# Patient Record
Sex: FEMALE | Race: OTHER | HISPANIC OR LATINO | Employment: PART TIME | URBAN - METROPOLITAN AREA
[De-identification: names, ages, dates, MRNs, and addresses within clinical notes are randomized per-mention and may not be internally consistent; named-entity substitution may affect disease eponyms.]

---

## 2022-08-29 ENCOUNTER — HOSPITAL ENCOUNTER (OUTPATIENT)
Facility: HOSPITAL | Age: 42
Discharge: HOME | End: 2022-08-30
Attending: EMERGENCY MEDICINE | Admitting: SURGERY

## 2022-08-29 DIAGNOSIS — K35.80 ACUTE APPENDICITIS, UNSPECIFIED ACUTE APPENDICITIS TYPE: Primary | ICD-10-CM

## 2022-08-29 LAB
ALBUMIN SERPL-MCNC: 3.9 GM/DL (ref 3.5–5)
ALBUMIN/GLOB SERPL: 0.9 RATIO (ref 1.1–2)
ALP SERPL-CCNC: 89 UNIT/L (ref 40–150)
ALT SERPL-CCNC: 12 UNIT/L (ref 0–55)
APPEARANCE UR: CLEAR
AST SERPL-CCNC: 19 UNIT/L (ref 5–34)
B-HCG SERPL QL: NEGATIVE
B-HCG UR QL: NEGATIVE
BACTERIA #/AREA URNS AUTO: NORMAL /HPF
BASOPHILS # BLD AUTO: 0.05 X10(3)/MCL (ref 0–0.2)
BASOPHILS NFR BLD AUTO: 0.5 %
BILIRUB UR QL STRIP.AUTO: NEGATIVE MG/DL
BILIRUBIN DIRECT+TOT PNL SERPL-MCNC: 0.6 MG/DL
BUN SERPL-MCNC: 7.8 MG/DL (ref 7–18.7)
CALCIUM SERPL-MCNC: 9.4 MG/DL (ref 8.4–10.2)
CHLORIDE SERPL-SCNC: 102 MMOL/L (ref 98–107)
CO2 SERPL-SCNC: 29 MMOL/L (ref 22–29)
COLOR UR AUTO: YELLOW
CREAT SERPL-MCNC: 0.67 MG/DL (ref 0.55–1.02)
CTP QC/QA: YES
EOSINOPHIL # BLD AUTO: 0.29 X10(3)/MCL (ref 0–0.9)
EOSINOPHIL NFR BLD AUTO: 2.7 %
ERYTHROCYTE [DISTWIDTH] IN BLOOD BY AUTOMATED COUNT: 16 % (ref 11.5–17)
GFR SERPLBLD CREATININE-BSD FMLA CKD-EPI: >60 MLS/MIN/1.73/M2
GLOBULIN SER-MCNC: 4.2 GM/DL (ref 2.4–3.5)
GLUCOSE SERPL-MCNC: 93 MG/DL (ref 74–100)
GLUCOSE UR QL STRIP.AUTO: NEGATIVE MG/DL
HCT VFR BLD AUTO: 41.4 % (ref 37–47)
HGB BLD-MCNC: 13.1 GM/DL (ref 12–16)
IMM GRANULOCYTES # BLD AUTO: 0.05 X10(3)/MCL (ref 0–0.04)
IMM GRANULOCYTES NFR BLD AUTO: 0.5 %
KETONES UR QL STRIP.AUTO: NEGATIVE MG/DL
LEUKOCYTE ESTERASE UR QL STRIP.AUTO: NEGATIVE UNIT/L
LIPASE SERPL-CCNC: 12 U/L
LYMPHOCYTES # BLD AUTO: 2.75 X10(3)/MCL (ref 0.6–4.6)
LYMPHOCYTES NFR BLD AUTO: 25.8 %
MCH RBC QN AUTO: 28.8 PG (ref 27–31)
MCHC RBC AUTO-ENTMCNC: 31.6 MG/DL (ref 33–36)
MCV RBC AUTO: 91 FL (ref 80–94)
MONOCYTES # BLD AUTO: 0.66 X10(3)/MCL (ref 0.1–1.3)
MONOCYTES NFR BLD AUTO: 6.2 %
NEUTROPHILS # BLD AUTO: 6.9 X10(3)/MCL (ref 2.1–9.2)
NEUTROPHILS NFR BLD AUTO: 64.3 %
NITRITE UR QL STRIP.AUTO: NEGATIVE
NRBC BLD AUTO-RTO: 0 %
PH UR STRIP.AUTO: 6.5 [PH]
PLATELET # BLD AUTO: 400 X10(3)/MCL (ref 130–400)
PMV BLD AUTO: 9.9 FL (ref 7.4–10.4)
POTASSIUM SERPL-SCNC: 4.3 MMOL/L (ref 3.5–5.1)
PROT SERPL-MCNC: 8.1 GM/DL (ref 6.4–8.3)
PROT UR QL STRIP.AUTO: NEGATIVE MG/DL
RBC # BLD AUTO: 4.55 X10(6)/MCL (ref 4.2–5.4)
RBC #/AREA URNS AUTO: <5 /HPF
RBC UR QL AUTO: ABNORMAL UNIT/L
SODIUM SERPL-SCNC: 135 MMOL/L (ref 136–145)
SP GR UR STRIP.AUTO: 1.01 (ref 1–1.03)
SQUAMOUS #/AREA URNS AUTO: <5 /HPF
UROBILINOGEN UR STRIP-ACNC: 0.2 MG/DL
WBC # SPEC AUTO: 10.7 X10(3)/MCL (ref 4.5–11.5)
WBC #/AREA URNS AUTO: <5 /HPF

## 2022-08-29 PROCEDURE — G0378 HOSPITAL OBSERVATION PER HR: HCPCS

## 2022-08-29 PROCEDURE — 80053 COMPREHEN METABOLIC PANEL: CPT | Performed by: PHYSICIAN ASSISTANT

## 2022-08-29 PROCEDURE — 81025 URINE PREGNANCY TEST: CPT | Performed by: NURSE PRACTITIONER

## 2022-08-29 PROCEDURE — 36415 COLL VENOUS BLD VENIPUNCTURE: CPT | Performed by: PHYSICIAN ASSISTANT

## 2022-08-29 PROCEDURE — 96375 TX/PRO/DX INJ NEW DRUG ADDON: CPT

## 2022-08-29 PROCEDURE — 81001 URINALYSIS AUTO W/SCOPE: CPT | Performed by: PHYSICIAN ASSISTANT

## 2022-08-29 PROCEDURE — 83690 ASSAY OF LIPASE: CPT | Performed by: PHYSICIAN ASSISTANT

## 2022-08-29 PROCEDURE — 63600175 PHARM REV CODE 636 W HCPCS: Performed by: NURSE PRACTITIONER

## 2022-08-29 PROCEDURE — 99285 EMERGENCY DEPT VISIT HI MDM: CPT | Mod: 25

## 2022-08-29 PROCEDURE — 85025 COMPLETE CBC W/AUTO DIFF WBC: CPT | Performed by: PHYSICIAN ASSISTANT

## 2022-08-29 PROCEDURE — 25500020 PHARM REV CODE 255: Performed by: NURSE PRACTITIONER

## 2022-08-29 RX ORDER — ONDANSETRON 4 MG/1
8 TABLET, ORALLY DISINTEGRATING ORAL EVERY 8 HOURS PRN
Status: DISCONTINUED | OUTPATIENT
Start: 2022-08-30 | End: 2022-08-30 | Stop reason: HOSPADM

## 2022-08-29 RX ORDER — SODIUM CHLORIDE 9 MG/ML
INJECTION, SOLUTION INTRAVENOUS CONTINUOUS
Status: DISCONTINUED | OUTPATIENT
Start: 2022-08-30 | End: 2022-08-30 | Stop reason: HOSPADM

## 2022-08-29 RX ORDER — ACETAMINOPHEN 325 MG/1
650 TABLET ORAL EVERY 8 HOURS PRN
Status: DISCONTINUED | OUTPATIENT
Start: 2022-08-30 | End: 2022-08-30 | Stop reason: HOSPADM

## 2022-08-29 RX ORDER — ONDANSETRON 2 MG/ML
4 INJECTION INTRAMUSCULAR; INTRAVENOUS
Status: COMPLETED | OUTPATIENT
Start: 2022-08-29 | End: 2022-08-29

## 2022-08-29 RX ORDER — ACETAMINOPHEN 325 MG/1
650 TABLET ORAL EVERY 6 HOURS
Status: DISCONTINUED | OUTPATIENT
Start: 2022-08-30 | End: 2022-08-30 | Stop reason: HOSPADM

## 2022-08-29 RX ORDER — ENOXAPARIN SODIUM 100 MG/ML
40 INJECTION SUBCUTANEOUS EVERY 24 HOURS
Status: DISCONTINUED | OUTPATIENT
Start: 2022-08-30 | End: 2022-08-30

## 2022-08-29 RX ORDER — OXYCODONE HYDROCHLORIDE 5 MG/1
5 TABLET ORAL EVERY 4 HOURS PRN
Status: DISCONTINUED | OUTPATIENT
Start: 2022-08-30 | End: 2022-08-30 | Stop reason: HOSPADM

## 2022-08-29 RX ORDER — MORPHINE SULFATE 4 MG/ML
2 INJECTION, SOLUTION INTRAMUSCULAR; INTRAVENOUS
Status: COMPLETED | OUTPATIENT
Start: 2022-08-29 | End: 2022-08-29

## 2022-08-29 RX ORDER — TALC
6 POWDER (GRAM) TOPICAL NIGHTLY PRN
Status: DISCONTINUED | OUTPATIENT
Start: 2022-08-30 | End: 2022-08-30 | Stop reason: HOSPADM

## 2022-08-29 RX ORDER — MORPHINE SULFATE 4 MG/ML
2 INJECTION, SOLUTION INTRAMUSCULAR; INTRAVENOUS EVERY 4 HOURS PRN
Status: DISCONTINUED | OUTPATIENT
Start: 2022-08-30 | End: 2022-08-30 | Stop reason: HOSPADM

## 2022-08-29 RX ORDER — OXYCODONE HYDROCHLORIDE 5 MG/1
10 TABLET ORAL EVERY 4 HOURS PRN
Status: DISCONTINUED | OUTPATIENT
Start: 2022-08-30 | End: 2022-08-30 | Stop reason: HOSPADM

## 2022-08-29 RX ORDER — LIDOCAINE HYDROCHLORIDE 10 MG/ML
1 INJECTION, SOLUTION EPIDURAL; INFILTRATION; INTRACAUDAL; PERINEURAL ONCE
Status: DISCONTINUED | OUTPATIENT
Start: 2022-08-30 | End: 2022-08-30 | Stop reason: HOSPADM

## 2022-08-29 RX ORDER — SODIUM CHLORIDE 0.9 % (FLUSH) 0.9 %
10 SYRINGE (ML) INJECTION
Status: DISCONTINUED | OUTPATIENT
Start: 2022-08-30 | End: 2022-08-30 | Stop reason: HOSPADM

## 2022-08-29 RX ADMIN — IOPAMIDOL 100 ML: 755 INJECTION, SOLUTION INTRAVENOUS at 09:08

## 2022-08-29 RX ADMIN — MORPHINE SULFATE 2 MG: 4 INJECTION INTRAVENOUS at 10:08

## 2022-08-29 RX ADMIN — ONDANSETRON 4 MG: 2 INJECTION INTRAMUSCULAR; INTRAVENOUS at 10:08

## 2022-08-29 NOTE — FIRST PROVIDER EVALUATION
"Medical screening exam completed.  I have conducted a focused provider triage encounter, findings are as follows:    Brief history of present illness:  42 year old female presents to ED for abdominal pain and dysuria since last night; denies fever, vomiting     Vitals:    08/29/22 1132   BP: 100/62   Pulse: 91   Resp: 20   Temp: 98.4 °F (36.9 °C)   TempSrc: Oral   SpO2: 99%   Weight: 56.7 kg (125 lb)   Height: 5' 3" (1.6 m)       Pertinent physical exam:  awake, alert     Brief workup plan:  cbc, cmp, lipase, ua, upt     Preliminary workup initiated; this workup will be continued and followed by the physician or advanced practice provider that is assigned to the patient when roomed.  "

## 2022-08-30 ENCOUNTER — ANESTHESIA EVENT (OUTPATIENT)
Dept: SURGERY | Facility: HOSPITAL | Age: 42
End: 2022-08-30

## 2022-08-30 ENCOUNTER — ANESTHESIA (OUTPATIENT)
Dept: SURGERY | Facility: HOSPITAL | Age: 42
End: 2022-08-30

## 2022-08-30 VITALS
WEIGHT: 125 LBS | BODY MASS INDEX: 22.15 KG/M2 | OXYGEN SATURATION: 98 % | SYSTOLIC BLOOD PRESSURE: 102 MMHG | RESPIRATION RATE: 18 BRPM | TEMPERATURE: 98 F | HEIGHT: 63 IN | DIASTOLIC BLOOD PRESSURE: 61 MMHG | HEART RATE: 72 BPM

## 2022-08-30 PROBLEM — K35.80 ACUTE APPENDICITIS: Status: ACTIVE | Noted: 2022-08-30

## 2022-08-30 LAB
ANION GAP SERPL CALC-SCNC: 6 MEQ/L
BASOPHILS # BLD AUTO: 0.05 X10(3)/MCL (ref 0–0.2)
BASOPHILS NFR BLD AUTO: 0.8 %
BUN SERPL-MCNC: 7.1 MG/DL (ref 7–18.7)
CALCIUM SERPL-MCNC: 9.2 MG/DL (ref 8.4–10.2)
CHLORIDE SERPL-SCNC: 105 MMOL/L (ref 98–107)
CO2 SERPL-SCNC: 25 MMOL/L (ref 22–29)
CREAT SERPL-MCNC: 0.7 MG/DL (ref 0.55–1.02)
CREAT/UREA NIT SERPL: 10
EOSINOPHIL # BLD AUTO: 0.55 X10(3)/MCL (ref 0–0.9)
EOSINOPHIL NFR BLD AUTO: 8.6 %
ERYTHROCYTE [DISTWIDTH] IN BLOOD BY AUTOMATED COUNT: 15.9 % (ref 11.5–17)
GFR SERPLBLD CREATININE-BSD FMLA CKD-EPI: >60 MLS/MIN/1.73/M2
GLUCOSE SERPL-MCNC: 88 MG/DL (ref 74–100)
HCT VFR BLD AUTO: 43.6 % (ref 37–47)
HGB BLD-MCNC: 13.3 GM/DL (ref 12–16)
IMM GRANULOCYTES # BLD AUTO: 0.01 X10(3)/MCL (ref 0–0.04)
IMM GRANULOCYTES NFR BLD AUTO: 0.2 %
LYMPHOCYTES # BLD AUTO: 2.66 X10(3)/MCL (ref 0.6–4.6)
LYMPHOCYTES NFR BLD AUTO: 41.7 %
MAGNESIUM SERPL-MCNC: 2.3 MG/DL (ref 1.6–2.6)
MCH RBC QN AUTO: 28.7 PG (ref 27–31)
MCHC RBC AUTO-ENTMCNC: 30.5 MG/DL (ref 33–36)
MCV RBC AUTO: 94 FL (ref 80–94)
MONOCYTES # BLD AUTO: 0.61 X10(3)/MCL (ref 0.1–1.3)
MONOCYTES NFR BLD AUTO: 9.6 %
NEUTROPHILS # BLD AUTO: 2.5 X10(3)/MCL (ref 2.1–9.2)
NEUTROPHILS NFR BLD AUTO: 39.1 %
NRBC BLD AUTO-RTO: 0 %
PHOSPHATE SERPL-MCNC: 3.5 MG/DL (ref 2.3–4.7)
PLATELET # BLD AUTO: 284 X10(3)/MCL (ref 130–400)
PMV BLD AUTO: 10.3 FL (ref 7.4–10.4)
POTASSIUM SERPL-SCNC: 4 MMOL/L (ref 3.5–5.1)
RBC # BLD AUTO: 4.64 X10(6)/MCL (ref 4.2–5.4)
SARS-COV-2 RDRP RESP QL NAA+PROBE: NEGATIVE
SODIUM SERPL-SCNC: 136 MMOL/L (ref 136–145)
WBC # SPEC AUTO: 6.4 X10(3)/MCL (ref 4.5–11.5)

## 2022-08-30 PROCEDURE — 27201423 OPTIME MED/SURG SUP & DEVICES STERILE SUPPLY: Performed by: SURGERY

## 2022-08-30 PROCEDURE — 96366 THER/PROPH/DIAG IV INF ADDON: CPT

## 2022-08-30 PROCEDURE — 37000008 HC ANESTHESIA 1ST 15 MINUTES: Performed by: SURGERY

## 2022-08-30 PROCEDURE — 25000003 PHARM REV CODE 250: Performed by: ANESTHESIOLOGY

## 2022-08-30 PROCEDURE — 84100 ASSAY OF PHOSPHORUS: CPT | Performed by: STUDENT IN AN ORGANIZED HEALTH CARE EDUCATION/TRAINING PROGRAM

## 2022-08-30 PROCEDURE — 37000009 HC ANESTHESIA EA ADD 15 MINS: Performed by: SURGERY

## 2022-08-30 PROCEDURE — 85025 COMPLETE CBC W/AUTO DIFF WBC: CPT | Performed by: STUDENT IN AN ORGANIZED HEALTH CARE EDUCATION/TRAINING PROGRAM

## 2022-08-30 PROCEDURE — 83735 ASSAY OF MAGNESIUM: CPT | Performed by: STUDENT IN AN ORGANIZED HEALTH CARE EDUCATION/TRAINING PROGRAM

## 2022-08-30 PROCEDURE — G0378 HOSPITAL OBSERVATION PER HR: HCPCS

## 2022-08-30 PROCEDURE — 63600175 PHARM REV CODE 636 W HCPCS: Performed by: NURSE ANESTHETIST, CERTIFIED REGISTERED

## 2022-08-30 PROCEDURE — 96365 THER/PROPH/DIAG IV INF INIT: CPT

## 2022-08-30 PROCEDURE — 71000033 HC RECOVERY, INTIAL HOUR: Performed by: SURGERY

## 2022-08-30 PROCEDURE — 36415 COLL VENOUS BLD VENIPUNCTURE: CPT | Performed by: STUDENT IN AN ORGANIZED HEALTH CARE EDUCATION/TRAINING PROGRAM

## 2022-08-30 PROCEDURE — 25000003 PHARM REV CODE 250: Performed by: SURGERY

## 2022-08-30 PROCEDURE — 36000709 HC OR TIME LEV III EA ADD 15 MIN: Performed by: SURGERY

## 2022-08-30 PROCEDURE — 87635 SARS-COV-2 COVID-19 AMP PRB: CPT | Performed by: STUDENT IN AN ORGANIZED HEALTH CARE EDUCATION/TRAINING PROGRAM

## 2022-08-30 PROCEDURE — 36000708 HC OR TIME LEV III 1ST 15 MIN: Performed by: SURGERY

## 2022-08-30 PROCEDURE — 25000003 PHARM REV CODE 250: Performed by: NURSE ANESTHETIST, CERTIFIED REGISTERED

## 2022-08-30 PROCEDURE — 25000003 PHARM REV CODE 250

## 2022-08-30 PROCEDURE — 80048 BASIC METABOLIC PNL TOTAL CA: CPT | Performed by: STUDENT IN AN ORGANIZED HEALTH CARE EDUCATION/TRAINING PROGRAM

## 2022-08-30 PROCEDURE — 25000003 PHARM REV CODE 250: Performed by: STUDENT IN AN ORGANIZED HEALTH CARE EDUCATION/TRAINING PROGRAM

## 2022-08-30 PROCEDURE — 63600175 PHARM REV CODE 636 W HCPCS: Performed by: STUDENT IN AN ORGANIZED HEALTH CARE EDUCATION/TRAINING PROGRAM

## 2022-08-30 RX ORDER — ONDANSETRON 2 MG/ML
INJECTION INTRAMUSCULAR; INTRAVENOUS
Status: DISCONTINUED | OUTPATIENT
Start: 2022-08-30 | End: 2022-08-30

## 2022-08-30 RX ORDER — SCOLOPAMINE TRANSDERMAL SYSTEM 1 MG/1
PATCH, EXTENDED RELEASE TRANSDERMAL
Status: COMPLETED
Start: 2022-08-30 | End: 2022-08-30

## 2022-08-30 RX ORDER — OXYCODONE HYDROCHLORIDE 5 MG/1
5 TABLET ORAL EVERY 6 HOURS PRN
Qty: 15 TABLET | Refills: 0 | Status: SHIPPED | OUTPATIENT
Start: 2022-08-30

## 2022-08-30 RX ORDER — PROPOFOL 10 MG/ML
VIAL (ML) INTRAVENOUS
Status: DISCONTINUED | OUTPATIENT
Start: 2022-08-30 | End: 2022-08-30

## 2022-08-30 RX ORDER — LIDOCAINE HYDROCHLORIDE 20 MG/ML
INJECTION, SOLUTION EPIDURAL; INFILTRATION; INTRACAUDAL; PERINEURAL
Status: DISCONTINUED | OUTPATIENT
Start: 2022-08-30 | End: 2022-08-30

## 2022-08-30 RX ORDER — ENOXAPARIN SODIUM 100 MG/ML
40 INJECTION SUBCUTANEOUS EVERY 24 HOURS
Status: DISCONTINUED | OUTPATIENT
Start: 2022-08-30 | End: 2022-08-30 | Stop reason: HOSPADM

## 2022-08-30 RX ORDER — DEXAMETHASONE SODIUM PHOSPHATE 4 MG/ML
INJECTION, SOLUTION INTRA-ARTICULAR; INTRALESIONAL; INTRAMUSCULAR; INTRAVENOUS; SOFT TISSUE
Status: DISCONTINUED | OUTPATIENT
Start: 2022-08-30 | End: 2022-08-30

## 2022-08-30 RX ORDER — SCOLOPAMINE TRANSDERMAL SYSTEM 1 MG/1
1 PATCH, EXTENDED RELEASE TRANSDERMAL
Status: DISCONTINUED | OUTPATIENT
Start: 2022-08-30 | End: 2022-08-30 | Stop reason: HOSPADM

## 2022-08-30 RX ORDER — ROCURONIUM BROMIDE 10 MG/ML
INJECTION, SOLUTION INTRAVENOUS
Status: DISCONTINUED | OUTPATIENT
Start: 2022-08-30 | End: 2022-08-30

## 2022-08-30 RX ORDER — FENTANYL CITRATE 50 UG/ML
INJECTION, SOLUTION INTRAMUSCULAR; INTRAVENOUS
Status: DISCONTINUED | OUTPATIENT
Start: 2022-08-30 | End: 2022-08-30

## 2022-08-30 RX ORDER — MIDAZOLAM HYDROCHLORIDE 1 MG/ML
INJECTION INTRAMUSCULAR; INTRAVENOUS
Status: DISCONTINUED | OUTPATIENT
Start: 2022-08-30 | End: 2022-08-30

## 2022-08-30 RX ORDER — BUPIVACAINE HYDROCHLORIDE AND EPINEPHRINE 2.5; 5 MG/ML; UG/ML
INJECTION, SOLUTION EPIDURAL; INFILTRATION; INTRACAUDAL; PERINEURAL
Status: DISCONTINUED | OUTPATIENT
Start: 2022-08-30 | End: 2022-08-30 | Stop reason: HOSPADM

## 2022-08-30 RX ORDER — ACETAMINOPHEN 10 MG/ML
INJECTION, SOLUTION INTRAVENOUS
Status: DISCONTINUED | OUTPATIENT
Start: 2022-08-30 | End: 2022-08-30

## 2022-08-30 RX ORDER — PHENYLEPHRINE HYDROCHLORIDE 10 MG/ML
INJECTION INTRAVENOUS
Status: DISCONTINUED | OUTPATIENT
Start: 2022-08-30 | End: 2022-08-30

## 2022-08-30 RX ADMIN — PHENYLEPHRINE HYDROCHLORIDE 100 MCG: 10 INJECTION INTRAVENOUS at 08:08

## 2022-08-30 RX ADMIN — ROCURONIUM BROMIDE 40 MG: 10 INJECTION, SOLUTION INTRAVENOUS at 07:08

## 2022-08-30 RX ADMIN — PHENYLEPHRINE HYDROCHLORIDE 100 MCG: 10 INJECTION INTRAVENOUS at 07:08

## 2022-08-30 RX ADMIN — SCOPOLAMINE 1 PATCH: 1 PATCH TRANSDERMAL at 03:08

## 2022-08-30 RX ADMIN — ROCURONIUM BROMIDE 10 MG: 10 INJECTION, SOLUTION INTRAVENOUS at 07:08

## 2022-08-30 RX ADMIN — SUGAMMADEX 200 MG: 100 INJECTION, SOLUTION INTRAVENOUS at 08:08

## 2022-08-30 RX ADMIN — PIPERACILLIN AND TAZOBACTAM 4.5 G: 4; .5 INJECTION, POWDER, LYOPHILIZED, FOR SOLUTION INTRAVENOUS; PARENTERAL at 12:08

## 2022-08-30 RX ADMIN — OXYCODONE 10 MG: 5 TABLET ORAL at 04:08

## 2022-08-30 RX ADMIN — SCOPOLAMINE 1 PATCH: 1 PATCH TRANSDERMAL at 06:08

## 2022-08-30 RX ADMIN — SODIUM CHLORIDE, SODIUM GLUCONATE, SODIUM ACETATE, POTASSIUM CHLORIDE AND MAGNESIUM CHLORIDE: 526; 502; 368; 37; 30 INJECTION, SOLUTION INTRAVENOUS at 07:08

## 2022-08-30 RX ADMIN — ONDANSETRON 4 MG: 2 INJECTION INTRAMUSCULAR; INTRAVENOUS at 08:08

## 2022-08-30 RX ADMIN — MIDAZOLAM HYDROCHLORIDE 2 MG: 1 INJECTION, SOLUTION INTRAMUSCULAR; INTRAVENOUS at 07:08

## 2022-08-30 RX ADMIN — PROPOFOL 100 MG: 10 INJECTION, EMULSION INTRAVENOUS at 07:08

## 2022-08-30 RX ADMIN — ACETAMINOPHEN 1000 MG: 10 INJECTION, SOLUTION INTRAVENOUS at 08:08

## 2022-08-30 RX ADMIN — OXYCODONE 10 MG: 5 TABLET ORAL at 12:08

## 2022-08-30 RX ADMIN — PIPERACILLIN AND TAZOBACTAM 4.5 G: 4; .5 INJECTION, POWDER, LYOPHILIZED, FOR SOLUTION INTRAVENOUS; PARENTERAL at 07:08

## 2022-08-30 RX ADMIN — SODIUM CHLORIDE: 9 INJECTION, SOLUTION INTRAVENOUS at 01:08

## 2022-08-30 RX ADMIN — FENTANYL CITRATE 100 MCG: 50 INJECTION, SOLUTION INTRAMUSCULAR; INTRAVENOUS at 07:08

## 2022-08-30 RX ADMIN — ACETAMINOPHEN 650 MG: 325 TABLET ORAL at 03:08

## 2022-08-30 RX ADMIN — LIDOCAINE HYDROCHLORIDE 50 MG: 20 INJECTION, SOLUTION EPIDURAL; INFILTRATION; INTRACAUDAL; PERINEURAL at 08:08

## 2022-08-30 RX ADMIN — SODIUM CHLORIDE, SODIUM GLUCONATE, SODIUM ACETATE, POTASSIUM CHLORIDE AND MAGNESIUM CHLORIDE: 526; 502; 368; 37; 30 INJECTION, SOLUTION INTRAVENOUS at 08:08

## 2022-08-30 RX ADMIN — DEXAMETHASONE SODIUM PHOSPHATE 8 MG: 4 INJECTION, SOLUTION INTRA-ARTICULAR; INTRALESIONAL; INTRAMUSCULAR; INTRAVENOUS; SOFT TISSUE at 07:08

## 2022-08-30 RX ADMIN — LIDOCAINE HYDROCHLORIDE 50 MG: 20 INJECTION, SOLUTION EPIDURAL; INFILTRATION; INTRACAUDAL; PERINEURAL at 07:08

## 2022-08-30 RX ADMIN — PROPOFOL 50 MG: 10 INJECTION, EMULSION INTRAVENOUS at 07:08

## 2022-08-30 NOTE — PROGRESS NOTES
Acute Care Surgery  Daily Progress Note      Subjective:  Afebrile, VSS. NAEON. Consented for laparoscopic appendectomy this morning. All questions answered.    Objective:    Vitals:  Vitals:    08/30/22 0616   BP: 105/76   Pulse: 81   Resp: 18   Temp: 97.1 °F (36.2 °C)        Intake/Output:  N/a    Physical Exam:  Gen: NAD  Neuro: awake, alert, answering questions appropriately  CV: RR  Resp: non-labored breathing, ANAIS  Abd: soft, ND, tender to palpation in RLQ  Ext: moves all 4 spontaneously and purposefully  Skin: warm, well perfused    Labs:  WBC 6.4 (10.7)    Imaging:  None new    Micro/Path/Other:  N/a    Assessment/Plan:  41yo with PMHx of HIV, TB, and bronchiectasis comes to the ED with acute appendicitis.     - OR this morning for laparoscopic appendectomy  - Zosyn  - NPO  - mIVF @ 125cc/hr  - Lovenox  - prn pain and nausea control  - IS       Lana Mancilla MD   [employer] General Surgery, PGY2  08/30/2022 6:41 AM

## 2022-08-30 NOTE — DISCHARGE SUMMARY
[facility] - 5th Floor Med Surg  Discharge Summary      Admit Date: 8/29/2022    Discharge Date and Time:  08/30/2022 9:20 AM    Attending Physician: Merrick Hernandez MD     Reason for Admission: acute appendicitis    Procedures Performed: Procedure(s) (LRB):  APPENDECTOMY, LAPAROSCOPIC (N/A)    Hospital Course (synopsis of major diagnoses, care, treatment, and services provided during the course of the hospital stay): She was admitted on the evening of 8/29/22 with acute appendicitis seen on CT. She was started on IV antibiotics. The next morning, she was taken to the OR for laparoscopic appendectomy. See operative report for details. Post-operatively, she was deemed stable for discharge.     Goals of Care Treatment Preferences:  Code Status: Full Code      Consults: none    Final Diagnoses:    Principal Problem: Acute appendicitis   Secondary Diagnoses:   Active Hospital Problems    Diagnosis  POA    *Acute appendicitis [K35.80]  Yes      Resolved Hospital Problems   No resolved problems to display.       Discharged Condition: fair    Disposition: Home or Self Care    Follow Up/Patient Instructions:     Medications:  Reconciled Home Medications:      Medication List        START taking these medications      oxyCODONE 5 MG immediate release tablet  Commonly known as: ROXICODONE  Take 1 tablet (5 mg total) by mouth every 6 (six) hours as needed (severe pain).            Discharge Procedure Orders   Diet Adult Regular     Lifting restrictions   Order Comments: No heavy lifting ( >10 lbs) for 3 weeks.     No dressing needed   Order Comments: Ok to shower as normal tomorrow. Avoid soaking in a bath or swimming in a pool for 1 week.      Follow-up Information       [facility] Follow up.    Why: Office will call you with a telemed visit on 9/14/22. Please call with any questions or concerns  Contact information:  [address] [address] [phone]

## 2022-08-30 NOTE — OP NOTE
[facility]                       [address]                      [place] [address]    PATIENT NAME:      DISHA CARPENTER   YOB: 1980  CSN:               [id number]  MRN:               [id number]  ADMIT DATE:        08/29/2022 11:38:00  PHYSICIAN:         Bethel Herrmann MD                          OPERATIVE REPORT      DATE OF SURGERY:    08/30/2022 00:00:00    SURGEON:  Bethel Herrmann MD    RESIDENT SURGEON:  Dr. Lana Smyth, PGY-2.    PREOPERATIVE DIAGNOSES:    1. Human immunodeficiency virus.  2. Acute appendicitis.    POSTOPERATIVE DIAGNOSES:    1. Human immunodeficiency virus.  2. Acute appendicitis.    PROCEDURE:  Laparoscopic appendectomy.    ANESTHESIA:  General endotracheal.    COMPLICATIONS:  None.    CONDITION:  Stable.    SPECIMEN:  Appendix.    ESTIMATED BLOOD LOSS:  5 cc.    FINDINGS:  Acute appendicitis without perforation.    PROCEDURE IN DETAIL:  After appropriate consents were obtained, the patient was   brought back to the operative suite, placed in supine position, placed under   general endotracheal anesthesia.  Next, the abdomen was prepped and draped in   the usual sterile fashion.  At this time, a time-out was done to make sure we   had the appropriate patient, appropriate operation, and the appropriate preop   medications.  Next, a supraumbilical incision was made, and a Veress needle was   used to enter the abdomen.  Once in the abdomen, pneumoperitoneum was created.    Next, a 5 mm optical trocar was used to enter the abdomen and the supraumbilical   spot where the Veress needle was under direct visualization.  Once we were in   the abdomen, we placed a suprapubic 5 mm and a 12 mm left lower quadrant trocar   under direct visualization.  Next, we put the patient head down and airplaned to   the patient's left.  We were able to easily see the inflamed appendix without   perforation.  We  grasped the appendix and made a window with the [place]   dissector at the base of the appendix between the cecum and the appendix.  Once   we had a big enough window for a stapler, we used a laparoscopic [place] 55 mm   blue load to come across the base of the appendix, and then we used 2 white   loads to come across the mesoappendix.  There were 2 point bleeders on the   staple line that we stopped with 2 hemoclips.  We then placed the appendix in a   laparoscopic EndoCatch bag and then removed it through the left lower quadrant   port site.  Next, we then evaluated for any bleeding.  No bleeding was seen.    Pneumoperitoneum was deflated.  All ports were removed, and the left lower   quadrant fascia site was closed with 0 Vicryl on a UR-6.  All skin incisions   were closed with 4-0 Vicryl and Dermabond.  At case end, all counts were   correct.  Patient tolerated procedure well, was ultimately transferred back to   PACU in stable condition.        ______________________________  Bethel Herrmann MD    WWMARIANNE/AQS  DD:  08/30/2022  Time:  08:32AM  DT:  08/30/2022  Time:  08:45AM  Job #:  [id number]      OPERATIVE REPORT

## 2022-08-30 NOTE — BRIEF OP NOTE
[facility] - 5th Floor Med Surg  Brief Operative Note    SUMMARY     Surgery Date: 8/30/2022     Surgeon(s) and Role:     * Bethel Herrmann Jr., MD - Primary    Assisting Surgeon: Lana Mancilla MD PGY2    Pre-op Diagnosis:  Acute appendicitis, unspecified acute appendicitis type [K35.80]    Post-op Diagnosis:  Post-Op Diagnosis Codes:     * Acute appendicitis, unspecified acute appendicitis type [K35.80]    Procedure(s) (LRB):  APPENDECTOMY, LAPAROSCOPIC (N/A)    Anesthesia: General    Operative Findings: inflamed appendix without perforation    Estimated Blood Loss: * No values recorded between 8/30/2022  7:49 AM and 8/30/2022  8:24 AM *    Estimated Blood Loss has not been documented. EBL = 5cc.         Specimens:   Specimen (24h ago, onward)       Start     Ordered    08/30/22 0815  Specimen to Pathology  RELEASE UPON ORDERING        References:    Click here for ordering Quick Tip   Question:  Release to patient  Answer:  Immediate    08/30/22 0802                    [id number]

## 2022-08-30 NOTE — ED NOTES
Assumed care for pt at this time. Pt presents to ed with complaints of dysuria and lower abd pain. Pt [place] speaking. Pt HIV+. Pt in NAD, AAOx4. Sx resident at bedside at this time. Pt resting comfortably  w/ no complaints. Pt is AAOx4, in NAD,  at bedside. Cardiac-respiratory monitors in progress

## 2022-08-30 NOTE — ED PROVIDER NOTES
Encounter Date: 2022       History     Chief Complaint   Patient presents with    Dysuria    Abdominal Pain     Pt. C/o lower abdominal pain and dysuria since yesterday. HIV +.     Patient is a 42-year-old female  that presents with periumbilical abdominal pain that has been present yesterday. Associated symptoms none. Surrounding information is nothing. Exacerbated by nothing. Relieved by nothing. Patient treatment prior to arrival none. Risk factors include none. Other history pertaining to this complaint nothing.       The history is provided by the patient. The history is limited by a language barrier. A  was used.   Review of patient's allergies indicates:  No Known Allergies  Past Medical History:   Diagnosis Date    Human immunodeficiency virus (HIV) disease     Human immunodeficiency virus (HIV) disease     TB (tuberculosis)      Past Surgical History:   Procedure Laterality Date     SECTION       History reviewed. No pertinent family history.  Social History     Tobacco Use    Smoking status: Never     Passive exposure: Never    Smokeless tobacco: Never   Substance Use Topics    Alcohol use: Not Currently    Drug use: Yes     Review of Systems   Constitutional:  Negative for chills and fever.   HENT:  Negative for trouble swallowing.    Respiratory:  Negative for chest tightness.    Cardiovascular:  Negative for chest pain.   Gastrointestinal:  Positive for abdominal pain.   Genitourinary:  Negative for dysuria and frequency.   Psychiatric/Behavioral:  Negative for suicidal ideas.    All other systems reviewed and are negative.    Physical Exam     Initial Vitals [22 1132]   BP Pulse Resp Temp SpO2   100/62 91 20 98.4 °F (36.9 °C) 99 %      MAP       --         Physical Exam    Nursing note and vitals reviewed.  Constitutional: She appears well-developed and well-nourished.   HENT:   Head: Normocephalic.   Eyes: EOM are normal.   Neck:   Normal range of  motion.  Cardiovascular:  Normal rate, regular rhythm, normal heart sounds and intact distal pulses.           Pulmonary/Chest: Breath sounds normal. No respiratory distress.   Abdominal: Abdomen is soft. Bowel sounds are normal. There is no abdominal tenderness.   Musculoskeletal:         General: Normal range of motion.      Cervical back: Normal range of motion.     Neurological: She is alert and oriented to person, place, and time. She has normal strength.   Skin: Skin is warm and dry.   Psychiatric: She has a normal mood and affect. Her behavior is normal. Judgment and thought content normal.       ED Course   Procedures  Labs Reviewed   URINALYSIS, REFLEX TO URINE CULTURE - Abnormal; Notable for the following components:       Result Value    Blood, UA Trace (*)     All other components within normal limits   COMPREHENSIVE METABOLIC PANEL - Abnormal; Notable for the following components:    Sodium Level 135 (*)     Globulin 4.2 (*)     Albumin/Globulin Ratio 0.9 (*)     All other components within normal limits   CBC WITH DIFFERENTIAL - Abnormal; Notable for the following components:    MCHC 31.6 (*)     IG# 0.05 (*)     All other components within normal limits   LIPASE - Normal   URINALYSIS, MICROSCOPIC - Normal   HCG QUALITATIVE URINE - Normal   SARS-COV-2 RNA AMPLIFICATION, QUAL - Normal   CBC W/ AUTO DIFFERENTIAL    Narrative:     The following orders were created for panel order CBC Auto Differential.  Procedure                               Abnormality         Status                     ---------                               -----------         ------                     CBC with Differential[[id number]]        Abnormal            Final result                 Please view results for these tests on the individual orders.          Imaging Results              CT Abdomen Pelvis With Contrast (Final result)  Result time 08/30/22 08:11:46      Final result by Trent Barrett MD (08/30/22 08:11:46)                    Impression:      1. Findings suggestive of acute appendicitis.  No abscess or pneumoperitoneum appreciated.  2. Tiny ground-glass nodules in the medial right middle lobe likely relate to endobronchial infection or inflammation.  No significant discrepancy between my interpretation and the preliminary radiology report.      Electronically signed by: Trent Barrett  Date:    08/30/2022  Time:    08:11               Narrative:    EXAMINATION:  CT ABDOMEN PELVIS WITH CONTRAST    CLINICAL HISTORY:  Abdominal pain, acute, nonlocalized;    TECHNIQUE:  CT imaging of the abdomen and pelvis after the administration of 100 mL of Isovue 370 intravenous contrast. Dose length product is 181 mGycm. Automatic exposure control, adjustment of mA/kV or iterative reconstruction technique used to limit radiation dose.    COMPARISON:  Chest CT 03/20/2015    FINDINGS:  Liver/biliary: Normal liver.  No radiodense gallstones. No intra or extrahepatic biliary ductal dilation.    Pancreas: Normal.    Spleen: Normal.    Adrenals: Normal.    Genitourinary: Symmetric renal enhancement. No hydronephrosis. Bladder under distended with no definitive abnormality.  No adnexal mass.    Stomach/bowel: Appendix contains luminal fluid and measures 9 mm in diameter with mural hyperenhancement.  Small bowel and colon otherwise normal in caliber.    Lymph nodes: No pathologically enlarged lymph node identified.    Peritoneum: No fluid collection or pneumoperitoneum.    Vasculature: Normal abdominal aortic caliber.    Abdominal wall: Normal.    Lung bases: Left lower lung bronchiectasis and volume loss similar to the 2015 chest CT.  Tiny centrilobular nodules within the medial right middle lobe.    Musculoskeletal: No acute osseous findings.                        Preliminary result by Trent Barrett MD (08/29/22 21:24:10)                   Narrative:    START OF REPORT:  Technique: CT of the abdomen and pelvis was performed with axial images as  well as sagittal and coronal reconstruction images with intravenous contrast but without oral contrast.    Comparison: None available.    Clinical History: Abdominal Pain (Pt. C/o lower abdominal pain and dysuria since yesterday. HIV +.).    Dosage Information: Automated Exposure Control was utilized.    Findings:  Lines and Tubes: None.  Thorax:  Lungs: Cystic bronchiectasis with collapse of the visualized left lower lobe and pleural thickening is seen at the left lung base with mediastinal shift towards left. Focal ground-glass opacity is seen in the right middle lobe anteromedially (series 4 image 8). This may reflect pneumonic infiltrate versus subsegmental atelectasis.  Pleura: No pleural effusion is seen.  Heart: The heart size is within normal limits.  Abdomen:  Abdominal Wall: No abdominal wall pathology is seen.  Liver: The liver appears unremarkable.  Biliary System: No extrahepatic biliary duct dilatation is seen.  Gallbladder: The gallbladder appears unremarkable.  Pancreas: The pancreas appears unremarkable.  Spleen: The spleen appears unremarkable.  Adrenals: The adrenal glands appear unremarkable.  Kidneys: The kidneys appear unremarkable with no stones cysts masses or hydronephrosis.  Aorta: The abdominal aorta appears unremarkable.  IVC: Unremarkable.  Bowel:  Esophagus: The visualized esophagus appears unremarkable.  Stomach: The stomach appears unremarkable.  Duodenum: Unremarkable appearing duodenum.  Small Bowel: The small bowel appears unremarkable.  Colon: There is moderate stool in the colon which could reflect an element of constipation.  Appendix: The appendix is fluid distended with thick enhancing wall which measures 9 mm in diameter (series 2 image 60). There is associated mild periappendiceal fat stranding. This is consistent with acute appendicitis. No free intraperitoneal gas or abscess is seen.    Pelvis:  Bladder: The bladder is nondistended and cannot be definitively  evaluated.  Female:  Uterus: The uterus appears unremarkable.  Ovaries: No adnexal masses are seen.    Bony structures:  Dorsal Spine: The visualized dorsal spine appears unremarkable.  Bony Pelvis: The visualized bony structures of the pelvis appear unremarkable.    Notifications: The results were discussed with the emergency room KARYNA Lopes to dictation at 2022-08-29 22:04:19 CDT.      Impression:  1. Cystic bronchiectasis with collapse of the visualized left lower lobe and pleural thickening is seen at the left lung base with mediastinal shift towards left. Focal ground-glass opacity is seen in the right middle lobe anteromedially (series 4 image 8). This may reflect pneumonic infiltrate versus subsegmental atelectasis. Correlate with clinical and laboratory findings as regards additional evaluation and follow-up to full resolution.  2. The appendix is fluid distended with thick enhancing wall which measures 9 mm in diameter (series 2 image 60). There is associated mild periappendiceal fat stranding. This is consistent with acute appendicitis. No free intraperitoneal gas or abscess is seen. Correlate with clinical and laboratory findings as regards additional evaluation and follow-up.  3. Details and other findings as discussed above.                          Preliminary result by David Mane Jr., MD (08/29/22 21:24:10)                   Narrative:    START OF REPORT:  Technique: CT of the abdomen and pelvis was performed with axial images as well as sagittal and coronal reconstruction images with intravenous contrast but without oral contrast.    Comparison: None available.    Clinical History: Abdominal Pain (Pt. C/o lower abdominal pain and dysuria since yesterday. HIV +.).    Dosage Information: Automated Exposure Control was utilized.    Findings:  Lines and Tubes: None.  Thorax:  Lungs: Cystic bronchiectasis with collapse of the visualized left lower lobe and pleural thickening is seen at the left  lung base with mediastinal shift towards left. Focal ground-glass opacity is seen in the right middle lobe anteromedially (series 4 image 8). This may reflect pneumonic infiltrate versus subsegmental atelectasis.  Pleura: No pleural effusion is seen.  Heart: The heart size is within normal limits.  Abdomen:  Abdominal Wall: No abdominal wall pathology is seen.  Liver: The liver appears unremarkable.  Biliary System: No extrahepatic biliary duct dilatation is seen.  Gallbladder: The gallbladder appears unremarkable.  Pancreas: The pancreas appears unremarkable.  Spleen: The spleen appears unremarkable.  Adrenals: The adrenal glands appear unremarkable.  Kidneys: The kidneys appear unremarkable with no stones cysts masses or hydronephrosis.  Aorta: The abdominal aorta appears unremarkable.  IVC: Unremarkable.  Bowel:  Esophagus: The visualized esophagus appears unremarkable.  Stomach: The stomach appears unremarkable.  Duodenum: Unremarkable appearing duodenum.  Small Bowel: The small bowel appears unremarkable.  Colon: There is moderate stool in the colon which could reflect an element of constipation.  Appendix: The appendix is fluid distended with thick enhancing wall which measures 9 mm in diameter (series 2 image 60). There is associated mild periappendiceal fat stranding. This is consistent with acute appendicitis. No free intraperitoneal gas or abscess is seen.    Pelvis:  Bladder: The bladder is nondistended and cannot be definitively evaluated.  Female:  Uterus: The uterus appears unremarkable.  Ovaries: No adnexal masses are seen.    Bony structures:  Dorsal Spine: The visualized dorsal spine appears unremarkable.  Bony Pelvis: The visualized bony structures of the pelvis appear unremarkable.    Notifications: The results were discussed with the emergency room KARYNA Lopes to dictation at 2022-08-29 22:04:19 CDT.      Impression:  1. Cystic bronchiectasis with collapse of the visualized left lower lobe and  pleural thickening is seen at the left lung base with mediastinal shift towards left. Focal ground-glass opacity is seen in the right middle lobe anteromedially (series 4 image 8). This may reflect pneumonic infiltrate versus subsegmental atelectasis. Correlate with clinical and laboratory findings as regards additional evaluation and follow-up to full resolution.  2. The appendix is fluid distended with thick enhancing wall which measures 9 mm in diameter (series 2 image 60). There is associated mild periappendiceal fat stranding. This is consistent with acute appendicitis. No free intraperitoneal gas or abscess is seen. Correlate with clinical and laboratory findings as regards additional evaluation and follow-up.  3. Details and other findings as discussed above.                                         Medications   iopamidoL (ISOVUE-370) injection 100 mL (100 mLs Intravenous Given 8/29/22 2125)   morphine injection 2 mg (2 mg Intravenous Given 8/29/22 2218)   ondansetron injection 4 mg (4 mg Intravenous Given 8/29/22 2218)   scopolamine (TRANSDERM-SCOP) 1.3-1.5 mg (1 mg over 3 days) (1 patch  Patch Applied 8/30/22 1534)                 ED Course as of 08/30/22 2335   Mon Aug 29, 2022   2211  paged [CL]   2231 Surgery here to admit [CL]   223 I, Dr Rivera, saw this patient with the midlevel provider. I had face to face time with patient. I performed an independent history and physical examination and was involved in substantive portion of medical decision making.       Patient room course periumbilical abdominal pain for the past day or 2.  Has not been running any fever.  She denies any coughing.  On exam him called mild-to-moderate tenderness palpation periumbilical and right LL lower quadrant without guarding all on my exam.  Is no fever here and leukocytosis.  CT of the abdomen pelvis was obtained and shows concern of acute appendicitis.  I agree with planned admit to the surgical hospitalist   [LF]       ED Course User Index  [CL] JOHNNIE Sanchez  [LF] Obdulio Rivera MD               Clinical Impression:   Final diagnoses:  [K35.80] Acute appendicitis, unspecified acute appendicitis type (Primary)      ED Disposition Condition    Observation                 JOHNNIE Sanchez  08/29/22 2231       JOHNNIE Sanchez  08/30/22 5332

## 2022-08-30 NOTE — ANESTHESIA PREPROCEDURE EVALUATION
08/30/2022  Comfort Weber is a 42 y.o., female.    H/o TB, Bronchiectasis, HIV  Presents with Acute Appendicitis  Pre-op Assessment    I have reviewed the Patient Summary Reports.     I have reviewed the Nursing Notes. I have reviewed the NPO Status.   I have reviewed the Medications.     Review of Systems  Anesthesia Hx:  No problems with previous Anesthesia    Social:  Non-Smoker    Hematology/Oncology:         -- Leukopenia: Other Leukopenic conditions: TB Hematology Comments: HIV    Pulmonary:   H/o Bronchiectasis- stable, No O2       Physical Exam  General: Cooperative, Alert, Oriented and Cachexia    Airway:  Mallampati: II / II  Mouth Opening: Normal  TM Distance: Normal  Tongue: Normal  Neck ROM: Normal ROM    Dental:  Intact    Chest/Lungs:  Normal Respiratory Rate    Heart:  Rate: Normal  Rhythm: Regular Rhythm        Anesthesia Plan  Type of Anesthesia, risks & benefits discussed:    Anesthesia Type: Gen ETT  Intra-op Monitoring Plan: Standard ASA Monitors  Post Op Pain Control Plan: multimodal analgesia  Induction:  IV  Airway Plan: Direct, Post-Induction  Informed Consent: Informed consent signed with the Patient and all parties understand the risks and agree with anesthesia plan.  All questions answered. Patient consented to blood products? Yes  ASA Score: 3  Day of Surgery Review of History & Physical: H&P Update referred to the surgeon/provider.    Ready For Surgery From Anesthesia Perspective.     .

## 2022-08-30 NOTE — ANESTHESIA POSTPROCEDURE EVALUATION
Anesthesia Post Evaluation    Patient: Comfort Weber    Procedure(s) Performed: Procedure(s) (LRB):  APPENDECTOMY, LAPAROSCOPIC (N/A)    Final Anesthesia Type: general      Patient location during evaluation: PACU  Patient participation: Yes- Able to Participate  Level of consciousness: awake and alert  Post-procedure vital signs: reviewed and stable  Pain management: adequate  Airway patency: patent      Anesthetic complications: no      Cardiovascular status: hemodynamically stable  Respiratory status: unassisted  Hydration status: euvolemic  Follow-up not needed.          Vitals Value Taken Time   /61 08/30/22 0912   Temp 36.7 °C (98.1 °F) 08/30/22 0840   Pulse 81 08/30/22 0912   Resp 19 08/30/22 0912   SpO2 99 % 08/30/22 0912   Vitals shown include unvalidated device data.      Event Time   Out of Recovery 08/30/2022 09:14:00         Pain/Lorin Score: Pain Rating Prior to Med Admin: 6 (8/29/2022 10:18 PM)  Lorin Score: 9 (8/30/2022  9:14 AM)

## 2022-08-30 NOTE — ANESTHESIA PROCEDURE NOTES
Intubation    Date/Time: 8/30/2022 7:25 AM  Performed by: Arleth Glass  Authorized by: Charo Naidu MD     Intubation:     Induction:  Intravenous    Intubated:  Postinduction    Attempts:  1    Attempted By:  CRNA    Method of Intubation:  Direct    Blade:  Smith 2    Laryngeal View Grade: Grade IIA - cords partially seen      Difficult Airway Encountered?: No      Complications:  None    Airway Device:  Oral endotracheal tube    Airway Device Size:  7.5    Style/Cuff Inflation:  Cuffed (inflated to minimal occlusive pressure)    Inflation Amount (mL):  6    Tube secured:  21    Secured at:  The lips    Placement Verified By:  Capnometry    Complicating Factors:  None    Findings Post-Intubation:  BS equal bilateral and atraumatic/condition of teeth unchanged

## 2022-08-30 NOTE — TRANSFER OF CARE
"Anesthesia Transfer of Care Note    Patient: Comfort Weber    Procedure(s) Performed: Procedure(s) (LRB):  APPENDECTOMY, LAPAROSCOPIC (N/A)    Patient location: PACU    Anesthesia Type: general    Transport from OR: Transported from OR on room air with adequate spontaneous ventilation    Post pain: adequate analgesia    Post assessment: no apparent anesthetic complications    Post vital signs: stable    Level of consciousness: sedated    Nausea/Vomiting: no nausea/vomiting    Complications: none    Transfer of care protocol was followed      Last vitals:   Visit Vitals  /68 (BP Location: Right arm, Patient Position: Lying)   Pulse 69   Temp 36.7 °C (98.1 °F) (Skin)   Resp 17   Ht 5' 3" (1.6 m)   Wt 56.7 kg (125 lb)   LMP  (LMP Unknown)   SpO2 100%   Breastfeeding No   BMI 22.14 kg/m²     "

## 2022-08-30 NOTE — H&P
General/Acute Care Surgery   History and Physical     HD#0  POD#* No surgery found *    HPI  Patient is a 43yo F with PMHx of HIV, TB, and bronchiectasis with obliteration of left lung comes to ED with 1 day of periumbilical and RLQ pain. States that the pain was first achy but then felt stabbing in nature. Unable to eat or drink since breakfast this AM. She states symptoms have only worsened. Nothing makes them better. She denies fever, chills, nausea, vomiting, chest pain, SOB, new onset motor/sensory changes    Past Medical History: see above  Surgical History: c section, tubal ligation  Social History: denies smoking history    Review of Systems: 10 point ROS negative except as stated in HPI    Scheduled Meds:    Continuous Infusions:    PRN Meds:     Objective  Temp:  [98.4 °F (36.9 °C)] 98.4 °F (36.9 °C)  Pulse:  [91] 91  Resp:  [16-20] 16  SpO2:  [99 %] 99 %  BP: (100)/(62) 100/62     No intake/output data recorded.  No intake/output data recorded.     GEN: NAD  NEURO: AAOx3  RESP: No increased WOB, equal rise and fall of the chest  CV: Regular rate  ABD: Soft, tender, non distended. + McBurney, Rovsing  : Ledesma none  EXT: Moving all extremities spontaneously     Labs  Recent Labs     08/29/22  1204   WBC 10.7   HGB 13.1   HCT 41.4        Recent Labs     08/29/22  1204   *   K 4.3   CO2 29   BUN 7.8   CREATININE 0.67   CALCIUM 9.4   ALBUMIN 3.9   BILITOT 0.6   AST 19   ALKPHOS 89   ALT 12       Imaging  CT Abdomen Pelvis With Contrast              Assessment/Plan  43yo with PMHx of HIV, TB, and bronchiectasis comes to the ED with acute appendicitis.    - Admit to surgery floor  - Zosyn  - NPO  - mIVF @ 125cc/hr  - Lap Appy in AM  - Lovenox 40mg qd  - MM pain control  - PT/OT  - IS    Patrice Patel MD  [facility] Surgery    8/29/2022  11:40 PM

## 2022-08-31 LAB
ESTROGEN SERPL-MCNC: NORMAL PG/ML
INSULIN SERPL-ACNC: NORMAL U[IU]/ML
LAB AP CLINICAL INFORMATION: NORMAL
LAB AP GROSS DESCRIPTION: NORMAL
LAB AP REPORT FOOTNOTES: NORMAL
T3RU NFR SERPL: NORMAL %

## 2022-09-14 ENCOUNTER — DOCUMENTATION ONLY (OUTPATIENT)
Dept: SURGERY | Facility: HOSPITAL | Age: 42
End: 2022-09-14

## 2022-09-14 NOTE — PROGRESS NOTES
[facility] ACUTE CARE NOTE    Date of surgery: 8/30/2022  Procedure: Laparoscopic appendectomy  Surgeon: Dr. Alize Weber is Afghan speaking only. Will have our Afghan speaking Physician Resident contact patient back with pathology report.     Pathology:  Final Diagnosis   APPENDIX, APPENDECTOMY:     ACUTE APPENDICITIS WITH PERIAPPENDICITIS.     CODE 7      Electronically signed by Mj Leung MD on 8/31/2022 at 1132     Coni Hunt PA-C  [facility] Acute Care Surgery   09/14/2022 1:55 PM

## 2022-09-15 ENCOUNTER — HOSPITAL ENCOUNTER (EMERGENCY)
Facility: HOSPITAL | Age: 42
Discharge: HOME | End: 2022-09-15
Attending: EMERGENCY MEDICINE

## 2022-09-15 VITALS
OXYGEN SATURATION: 99 % | HEART RATE: 80 BPM | TEMPERATURE: 98 F | DIASTOLIC BLOOD PRESSURE: 60 MMHG | SYSTOLIC BLOOD PRESSURE: 105 MMHG | RESPIRATION RATE: 18 BRPM

## 2022-09-15 DIAGNOSIS — R50.82 POST-PROCEDURAL FEVER: ICD-10-CM

## 2022-09-15 DIAGNOSIS — Z90.49 HISTORY OF APPENDECTOMY: Primary | ICD-10-CM

## 2022-09-15 LAB
ALBUMIN SERPL-MCNC: 3.8 GM/DL (ref 3.5–5)
ALBUMIN/GLOB SERPL: 0.9 RATIO (ref 1.1–2)
ALP SERPL-CCNC: 81 UNIT/L (ref 40–150)
ALT SERPL-CCNC: 10 UNIT/L (ref 0–55)
APPEARANCE UR: CLEAR
AST SERPL-CCNC: 18 UNIT/L (ref 5–34)
B-HCG SERPL QL: NEGATIVE
BACTERIA #/AREA URNS AUTO: NORMAL /HPF
BASOPHILS # BLD AUTO: 0.04 X10(3)/MCL (ref 0–0.2)
BASOPHILS NFR BLD AUTO: 0.5 %
BILIRUB UR QL STRIP.AUTO: NEGATIVE MG/DL
BILIRUBIN DIRECT+TOT PNL SERPL-MCNC: 0.6 MG/DL
BUN SERPL-MCNC: 6.6 MG/DL (ref 7–18.7)
CALCIUM SERPL-MCNC: 9.2 MG/DL (ref 8.4–10.2)
CHLORIDE SERPL-SCNC: 104 MMOL/L (ref 98–107)
CO2 SERPL-SCNC: 24 MMOL/L (ref 22–29)
COLOR UR AUTO: YELLOW
CREAT SERPL-MCNC: 0.72 MG/DL (ref 0.55–1.02)
EOSINOPHIL # BLD AUTO: 0.17 X10(3)/MCL (ref 0–0.9)
EOSINOPHIL NFR BLD AUTO: 2.2 %
ERYTHROCYTE [DISTWIDTH] IN BLOOD BY AUTOMATED COUNT: 14.6 % (ref 11.5–17)
GFR SERPLBLD CREATININE-BSD FMLA CKD-EPI: >60 MLS/MIN/1.73/M2
GLOBULIN SER-MCNC: 4.1 GM/DL (ref 2.4–3.5)
GLUCOSE SERPL-MCNC: 85 MG/DL (ref 74–100)
GLUCOSE UR QL STRIP.AUTO: NEGATIVE MG/DL
HCT VFR BLD AUTO: 38.7 % (ref 37–47)
HGB BLD-MCNC: 12.7 GM/DL (ref 12–16)
IMM GRANULOCYTES # BLD AUTO: 0.03 X10(3)/MCL (ref 0–0.04)
IMM GRANULOCYTES NFR BLD AUTO: 0.4 %
KETONES UR QL STRIP.AUTO: NEGATIVE MG/DL
LEUKOCYTE ESTERASE UR QL STRIP.AUTO: NEGATIVE UNIT/L
LYMPHOCYTES # BLD AUTO: 2.45 X10(3)/MCL (ref 0.6–4.6)
LYMPHOCYTES NFR BLD AUTO: 32.1 %
MCH RBC QN AUTO: 29.7 PG (ref 27–31)
MCHC RBC AUTO-ENTMCNC: 32.8 MG/DL (ref 33–36)
MCV RBC AUTO: 90.6 FL (ref 80–94)
MONOCYTES # BLD AUTO: 0.68 X10(3)/MCL (ref 0.1–1.3)
MONOCYTES NFR BLD AUTO: 8.9 %
NEUTROPHILS # BLD AUTO: 4.3 X10(3)/MCL (ref 2.1–9.2)
NEUTROPHILS NFR BLD AUTO: 55.9 %
NITRITE UR QL STRIP.AUTO: NEGATIVE
NRBC BLD AUTO-RTO: 0 %
PH UR STRIP.AUTO: 6.5 [PH]
PLATELET # BLD AUTO: 437 X10(3)/MCL (ref 130–400)
PMV BLD AUTO: 9.7 FL (ref 7.4–10.4)
POTASSIUM SERPL-SCNC: 4.3 MMOL/L (ref 3.5–5.1)
PROT SERPL-MCNC: 7.9 GM/DL (ref 6.4–8.3)
PROT UR QL STRIP.AUTO: NEGATIVE MG/DL
RBC # BLD AUTO: 4.27 X10(6)/MCL (ref 4.2–5.4)
RBC #/AREA URNS AUTO: NORMAL /HPF
RBC UR QL AUTO: NEGATIVE UNIT/L
SODIUM SERPL-SCNC: 134 MMOL/L (ref 136–145)
SP GR UR STRIP.AUTO: 1.01 (ref 1–1.03)
SQUAMOUS #/AREA URNS AUTO: <5 /HPF
UROBILINOGEN UR STRIP-ACNC: 0.2 MG/DL
WBC # SPEC AUTO: 7.6 X10(3)/MCL (ref 4.5–11.5)
WBC #/AREA URNS AUTO: <5 /HPF

## 2022-09-15 PROCEDURE — 99285 EMERGENCY DEPT VISIT HI MDM: CPT | Mod: 25

## 2022-09-15 PROCEDURE — 25500020 PHARM REV CODE 255

## 2022-09-15 PROCEDURE — 85025 COMPLETE CBC W/AUTO DIFF WBC: CPT | Performed by: NURSE PRACTITIONER

## 2022-09-15 PROCEDURE — 36415 COLL VENOUS BLD VENIPUNCTURE: CPT | Performed by: NURSE PRACTITIONER

## 2022-09-15 PROCEDURE — 81001 URINALYSIS AUTO W/SCOPE: CPT | Performed by: NURSE PRACTITIONER

## 2022-09-15 PROCEDURE — 81025 URINE PREGNANCY TEST: CPT | Performed by: NURSE PRACTITIONER

## 2022-09-15 PROCEDURE — 80053 COMPREHEN METABOLIC PANEL: CPT | Performed by: NURSE PRACTITIONER

## 2022-09-15 RX ADMIN — IOPAMIDOL 100 ML: 755 INJECTION, SOLUTION INTRAVENOUS at 02:09

## 2022-09-15 NOTE — FIRST PROVIDER EVALUATION
Medical screening examination initiated.  I have conducted a focused provider triage encounter, findings are as follows:    Brief history of present illness:  43 y/o female who presents with having appendectomy 8/30/22 and she was telling surgery she was having fever and chills, dx with UTI. On bactrim since yesterday. Surgery recommended she return for evaluation.     There were no vitals filed for this visit.    Pertinent physical exam:  alert, nonlabored, ambulatory, surgical sites not red and no drainage.     Brief workup plan:  labs, urine    Preliminary workup initiated; this workup will be continued and followed by the physician or advanced practice provider that is assigned to the patient when roomed.

## 2022-09-15 NOTE — ED PROVIDER NOTES
"Encounter Date: 9/15/2022       History     Chief Complaint   Patient presents with    Fever     Had appendectomy on . Was advised to return to the ED for evaluation s/t patient having intermittent fevers and chills and having a "lump" at the LLQ laparoscopic incision site.  Was started on Bactrim yesterday.      41 y/o Upper sorbian speaking female presents to ED c/o "lump" under incision (prior appendectomy on 22), chills, intermittent fevers. Patient reports surgery called for follow up phone call and patient expressed concerns and was instructed to come to ED. Patient seen by outside PCP 2 days ago for same symptoms, dx with UTI, placed on Bactrim, Ketorolac, and Mupirocin. States symptoms have not worsened since appointment 2 days ago. No foul smell, drainage, erythema, or tenderness noted to either incisions; patient has 2. States last fever was 2 days ago; afebrile currently. Pmhx HIV.    The history is provided by the patient. The history is limited by a language barrier. A  was used ( number: [id number]).   Fever  Primary symptoms of the febrile illness include fever. Primary symptoms do not include fatigue, cough, wheezing, shortness of breath, abdominal pain, nausea, vomiting, myalgias or rash. The current episode started 2 days ago. This is a new problem. The problem has not changed since onset.  The maximum temperature recorded prior to her arrival was unknown.   Risk factors for febrile illness include HIV.  Review of patient's allergies indicates:  No Known Allergies  Past Medical History:   Diagnosis Date    Human immunodeficiency virus (HIV) disease     Human immunodeficiency virus (HIV) disease     TB (tuberculosis)      Past Surgical History:   Procedure Laterality Date     SECTION      LAPAROSCOPIC APPENDECTOMY N/A 2022    Procedure: APPENDECTOMY, LAPAROSCOPIC;  Surgeon: Bethel Herrmann Jr., MD;  Location: [facility];  Service: General;  " Laterality: N/A;     No family history on file.  Social History     Tobacco Use    Smoking status: Never     Passive exposure: Never    Smokeless tobacco: Never   Substance Use Topics    Alcohol use: Not Currently    Drug use: Yes     Review of Systems   Constitutional:  Positive for chills and fever. Negative for fatigue.   HENT: Negative.     Eyes: Negative.  Negative for photophobia and visual disturbance.   Respiratory:  Negative for cough, chest tightness, shortness of breath and wheezing.    Gastrointestinal:  Negative for abdominal pain, nausea and vomiting.   Endocrine: Negative.    Genitourinary:  Negative for decreased urine volume, flank pain and pelvic pain.   Musculoskeletal:  Negative for back pain, joint swelling and myalgias.   Skin:  Positive for wound. Negative for color change, pallor and rash.        2 incisions to abdomen; 1 below belly button and 1 on patient's LLQ   Neurological:  Negative for dizziness, syncope and weakness.   All other systems reviewed and are negative.    Physical Exam     Initial Vitals [09/15/22 1129]   BP Pulse Resp Temp SpO2   (!) 104/54 83 18 98.1 °F (36.7 °C) 99 %      MAP       --         Physical Exam    Nursing note and vitals reviewed.  Constitutional: She appears well-developed and well-nourished. She is not diaphoretic. No distress.   HENT:   Head: Normocephalic and atraumatic.   Nose: Nose normal.   Eyes: Conjunctivae and EOM are normal.   Neck: Neck supple. No JVD present.   Normal range of motion.  Cardiovascular:  Normal rate, regular rhythm, normal heart sounds and intact distal pulses.           Pulmonary/Chest: Breath sounds normal. No stridor. No respiratory distress. She has no wheezes. She exhibits no tenderness.   Abdominal: Abdomen is soft and flat. Bowel sounds are normal. She exhibits no distension. There is no abdominal tenderness. There is no rebound and no guarding.   Musculoskeletal:         General: No tenderness or edema. Normal range of  motion.      Cervical back: Normal range of motion and neck supple.     Neurological: She is alert and oriented to person, place, and time. She has normal strength and normal reflexes. GCS score is 15. GCS eye subscore is 4. GCS verbal subscore is 5. GCS motor subscore is 6.   Skin: Skin is warm and dry. Capillary refill takes less than 2 seconds. No ecchymosis, no rash and no abscess noted. No erythema.        2.5 cm approximated incision to umbilical region.   2 cm approximated incision to LLQ  No redness, tenderness, or drainage noted.  Small, soft, 2 cm lump noted near 2 cm laparoscopic site.    Psychiatric: She has a normal mood and affect. Thought content normal.       ED Course   Procedures  Labs Reviewed   CBC WITH DIFFERENTIAL - Abnormal; Notable for the following components:       Result Value    MCHC 32.8 (*)     Platelet 437 (*)     All other components within normal limits   COMPREHENSIVE METABOLIC PANEL - Abnormal; Notable for the following components:    Sodium Level 134 (*)     Blood Urea Nitrogen 6.6 (*)     Globulin 4.1 (*)     Albumin/Globulin Ratio 0.9 (*)     All other components within normal limits   URINALYSIS, REFLEX TO URINE CULTURE - Normal   PREGNANCY TEST, URINE RAPID - Normal   URINALYSIS, MICROSCOPIC - Normal          Imaging Results              CT Abdomen Pelvis With Contrast (Final result)  Result time 09/15/22 14:39:39      Final result by Cassandra Holden MD (09/15/22 14:39:39)                   Impression:      1. Trace pelvic free fluid is nonspecific.  2. Mild diffuse urinary bladder wall thickening.      Electronically signed by: Cassandra Holden  Date:    09/15/2022  Time:    14:39               Narrative:    EXAMINATION:  CT ABDOMEN PELVIS WITH CONTRAST    CLINICAL HISTORY:  Abdominal abscess/infection suspected;    TECHNIQUE:  CT imaging was performed of the abdomen and pelvis after the administration of intravenous contrast. Dose length product is 161 mGycm. Automatic  exposure control, adjustment of mA/kV or iterative reconstruction technique was used to limit radiation dose.    COMPARISON:  CT abdomen pelvis dated 08/29/2022    FINDINGS:  Liver: Normal.    Gallbladder and biliary tree: No calcified gallstones. No intra or extrahepatic biliary ductal dilation.    Pancreas: Normal.    Spleen: Normal.    Adrenals: Normal.    Kidneys and ureters: Normal.    Bladder: Mild diffuse urinary bladder wall thickening.    Reproductive organs: No pelvic masses.    Stomach/bowel: The appendix has been surgically resected.  There is mild colonic diverticulosis without inflammatory changes.  There is no bowel obstruction.    Lymph nodes: No pathologically enlarged lymph node identified.    Peritoneum: Trace pelvic free fluid.  No fluid collection or free air.    Vessels: No abdominal aortic aneurysm.    Abdominal wall: Normal.    Lung bases: Unchanged left lung base bronchiectasis and volume loss.    Bones: No acute osseous findings.                                       Medications   iopamidoL (ISOVUE-370) injection 100 mL (100 mLs Intravenous Given 9/15/22 1431)     Medical Decision Making:   Differential Diagnosis:    UTI, Abdominal Abscess, Post-Op Complication   Clinical Tests:   Lab Tests: Ordered and Reviewed  The following lab test(s) were unremarkable: CBC, CMP, UPT and Urinalysis       <> Summary of Lab: Unremarkable   Radiological Study: Ordered and Reviewed  ED Management:  Patient's complaint and previous medical hx warranted basic labs and UA. Patient's labs were unremarkable. CT revealed trace free fluid and mild urinary bladder thickening. Contacted surgery regarding patient's complaints and results. Dr. Harris at bedside. Patient to continue taking prescibed abx ointment, analgesic, and abx at home. Will follow up with PCP in 1 week and follow up with surgery in clinic as needed. No emergent or apparent distress noted prior to discharge.   Other:   I have discussed this case  with another health care provider.       <> Summary of the Discussion: Paged surgery to inform them of patient's return to ED and ER findings. Dr. Harris arrived to patient's bedside and assessed patient. Patient to be discharged home.                         Clinical Impression:   Final diagnoses:  [Z90.49] History of appendectomy (Primary)  [R50.82] Post-procedural fever      ED Disposition Condition    Discharge Stable          ED Prescriptions    None       Follow-up Information       Follow up With Specialties Details Why Contact Info    PCP  Call in 1 week As needed, If symptoms worsen     Follow up with surgery clinic as needed.   For wound re-check              Rocio Smith NP  09/15/22 9052

## 2022-09-15 NOTE — PROGRESS NOTES
"[facility] ACUTE CARE NOTE     Date of surgery: 8/30/2022  Procedure: Laparoscopic appendectomy  Surgeon: Bethel Herrmann MD    41 y/o F s/p laparoscopic appendectomy on 8/30/22 by Dr. Bethel Herrmann. Pt was called to discuss pathology report finding, as well as her post-op recovery course.    Pathology:  Final Diagnosis   APPENDIX, APPENDECTOMY:     ACUTE APPENDICITIS WITH PERIAPPENDICITIS.     CODE 7     Pt reported she has been having intermittent subjective fevers and chills since POD-3. This was associated with dysuria and polyuria that started around this same time. Pt reports symptoms have persisted, and of note, she noticed a small "ball-like lump" under one of her laparoscopic incisions. She saw a GP at an outside clinic yesterday (pt unable to recall details of clinic or physician at this time), where she was told she had a UTI and an "abdominal infection". She was given PO Abx with no further instructions for follow-up.    Plan:  - Advised pt to return to [facility] ED for evaluation. Writer unable to provide an assessment from phone interview. Pt stated she will plan on coming tomorrow morning   - Encouraged to call [facility] for any changes in her current symptoms or new questions/concerns in the meantime    Jaquan Harris MD  [facility] General Surgery    "

## 2022-09-15 NOTE — CONSULTS
"[facility]   Consult Note  Trauma and Acute Care Surgery    Patient Name: Comfort Weber  YOB: 1980  Date of Admission: 9/15/2022  Date: 09/15/2022 2:14 PM  Consulting Service: ED  Reason for Consult: Fevers/chills, abdominal pain and incisional port site incisional swelling.    PRESENTING HISTORY     Chief Complaint/Reason for Admission:   Fevers/chills, dysuria and "incision swelling"    History of Present Illness:  43 y/o F with Hx of HIV, TB, and recent laparoscopic appendectomy for appendicitis with Dr. Herrmann on 22. On phone-call f/u with pt yesterday, she reported she has been having intermittent fevers and chills since POD-3, and a small "ball-like lump" under her L lateral port site. She saw a GP at an outside clinic yesterday (pt unable to recall details of clinic or physician at this time), where she was told she had a UTI and an "abdominal infection". She was given PO Abx with no further instructions for follow-up. On presentation to [facility] ED, she reports resolution of her fevers/chills and reduced pain and swelling over her port site. She continues to endorse dysuria.    Denies N/V/D, erythema, induration or discharge of her incision sites, changes in her bowel movements.       Review of Systems:  12 point ROS negative except as stated in HPI    PAST HISTORY:   Past medical history:  Past Medical History:   Diagnosis Date    Human immunodeficiency virus (HIV) disease     Human immunodeficiency virus (HIV) disease     TB (tuberculosis)      Past Medical History:   Diagnosis Date    Human immunodeficiency virus (HIV) disease     Human immunodeficiency virus (HIV) disease     TB (tuberculosis)        Past surgical history:  Past Surgical History:   Procedure Laterality Date     SECTION      LAPAROSCOPIC APPENDECTOMY N/A 2022    Procedure: APPENDECTOMY, LAPAROSCOPIC;  Surgeon: Bethel Herrmann Jr., MD;  Location: [facility];  Service: General;  Laterality: N/A; "     Past Surgical History:   Procedure Laterality Date     SECTION      LAPAROSCOPIC APPENDECTOMY N/A 2022    Procedure: APPENDECTOMY, LAPAROSCOPIC;  Surgeon: Bethel Herrmann Jr., MD;  Location: [facility];  Service: General;  Laterality: N/A;       Family history:  No family history on file.    Social history:  Social History     Socioeconomic History    Marital status: Single   Tobacco Use    Smoking status: Never     Passive exposure: Never    Smokeless tobacco: Never   Substance and Sexual Activity    Alcohol use: Not Currently    Drug use: Yes     Social History     Tobacco Use   Smoking Status Never    Passive exposure: Never   Smokeless Tobacco Never         MEDICATIONS & ALLERGIES:   Allergies: Review of patient's allergies indicates:  No Known Allergies    No current facility-administered medications on file prior to encounter.     Current Outpatient Medications on File Prior to Encounter   Medication Sig    oxyCODONE (ROXICODONE) 5 MG immediate release tablet Take 1 tablet (5 mg total) by mouth every 6 (six) hours as needed (severe pain).       Scheduled Meds:  None    Continuous Infusions:  None    PRN Meds:  None    OBJECTIVE:     Vital Signs:  Temp:  [98.1 °F (36.7 °C)] 98.1 °F (36.7 °C)  Pulse:  [80-83] 80  Resp:  [18-20] 20  SpO2:  [99 %] 99 %  BP: (103-104)/(54-73) 103/73  There is no height or weight on file to calculate BMI.     No intake/output data recorded.  No intake/output data recorded.    Physical Exam:  General:  Well developed, well nourished, no acute distress  HEENT:  Normocephalic, atraumatic, PERRL, EOMI, clear sclera, ears normal, neck supple, throat clear without erythema or exudates  CVS:  RRR  Resp:  Normal work of breathing on RA, equal rise and fall of the chest  GI: Abdomen soft, non-tender, non-distended, no masses, no guarding, no rebound. All port site incisions well-healed, c/d/I. L lateral port site with minimal induration and tenderness.  :  Deferred  MSK:   No muscle atrophy, cyanosis, peripheral edema, moving all extremities spontaneously  Vascular: *2+ radial pulses bilaterally*. All extremities WWP  Skin:  No rashes, ulcers, erythema  Neuro:  CNII-XII grossly intact, alert and oriented to person, place, and time    Laboratory:  Recent Labs     09/15/22  1207   WBC 7.6   HGB 12.7   HCT 38.7   *     Recent Labs     09/15/22  1207   *   K 4.3   CO2 24   BUN 6.6*   CREATININE 0.72   CALCIUM 9.2   ALBUMIN 3.8   BILITOT 0.6   AST 18   ALKPHOS 81   ALT 10     Troponin:  No results for input(s): TROPONINI in the last 72 hours.  CBC:  Recent Labs     09/15/22  1207   WBC 7.6   RBC 4.27   HGB 12.7   HCT 38.7   *   MCV 90.6   MCH 29.7   MCHC 32.8*     CMP:  Recent Labs     09/15/22  1207   CALCIUM 9.2   ALBUMIN 3.8   *   K 4.3   CO2 24   BUN 6.6*   CREATININE 0.72   ALKPHOS 81   ALT 10   AST 18   BILITOT 0.6     Lactic Acid:  No results for input(s): LACTATE in the last 72 hours.  Etoh:  No results for input(s): ALCOHOLMEDIC in the last 72 hours.  Drug Screen:  No results for input(s): PCDSCOMETHA, COCAINEMETAB, OPIATESCREEN, BARBITURATES, AMPHETAMINES, MARIJUANATHC, PCDSOPHENCYN, CREATRANDUR, TOXINFO in the last 72 hours.    ABG:  No results for input(s): PH, PCO2, PO2, HCO3, BE, POCSATURATED in the last 72 hours.    Diagnostic Results:  No results found in the last 24 hours.  CT Abdomen Pelvis With Contrast    (Results Pending)       Microbiology:  Microbiology Results (last 7 days)       ** No results found for the last 168 hours. **             ASSESSMENT & PLAN:   43 y/o F s/p recent lap appendectomy presenting with a UTI. Low-suspicion for SSI. CT Abd/Pelvis without signs of intra-abdominal or cutaneous fluid collections. Well-healed laparoscopic incisions on exam.    Plan:  - No indications for surgical intervention at this time. Advised pt to call [facility] acute care clinic with any questions or if her symptoms worsen  - Continue Bactrim for  UTI    Jaquan Harris MD  Trauma/Acute Care Surgery  9/15/2022  2:14 PM

## 2025-01-18 ENCOUNTER — HOSPITAL ENCOUNTER (EMERGENCY)
Facility: HOSPITAL | Age: 45
Discharge: HOME | End: 2025-01-18
Attending: EMERGENCY MEDICINE
Payer: COMMERCIAL

## 2025-01-18 VITALS
SYSTOLIC BLOOD PRESSURE: 91 MMHG | RESPIRATION RATE: 15 BRPM | TEMPERATURE: 98 F | OXYGEN SATURATION: 98 % | HEART RATE: 94 BPM | HEIGHT: 60 IN | DIASTOLIC BLOOD PRESSURE: 57 MMHG | WEIGHT: 97 LBS | BODY MASS INDEX: 19.04 KG/M2

## 2025-01-18 DIAGNOSIS — R50.9 FEVER: ICD-10-CM

## 2025-01-18 DIAGNOSIS — R05.1 ACUTE COUGH: Primary | ICD-10-CM

## 2025-01-18 LAB
ALBUMIN SERPL-MCNC: 3.4 G/DL (ref 3.5–5)
ALBUMIN/GLOB SERPL: 0.6 RATIO (ref 1.1–2)
ALP SERPL-CCNC: 87 UNIT/L (ref 40–150)
ALT SERPL-CCNC: 15 UNIT/L (ref 0–55)
ANION GAP SERPL CALC-SCNC: 7 MEQ/L
AST SERPL-CCNC: 23 UNIT/L (ref 5–34)
B-HCG UR QL: NEGATIVE
BACTERIA #/AREA URNS AUTO: ABNORMAL /HPF
BASOPHILS # BLD AUTO: 0.06 X10(3)/MCL
BASOPHILS NFR BLD AUTO: 0.4 %
BILIRUB SERPL-MCNC: 0.5 MG/DL
BILIRUB UR QL STRIP.AUTO: NEGATIVE
BUN SERPL-MCNC: 5.8 MG/DL (ref 7–18.7)
CALCIUM SERPL-MCNC: 8.8 MG/DL (ref 8.4–10.2)
CHLORIDE SERPL-SCNC: 104 MMOL/L (ref 98–107)
CLARITY UR: CLEAR
CO2 SERPL-SCNC: 23 MMOL/L (ref 22–29)
COLOR UR AUTO: ABNORMAL
CREAT SERPL-MCNC: 0.74 MG/DL (ref 0.55–1.02)
CREAT/UREA NIT SERPL: 8
EOSINOPHIL # BLD AUTO: 0.08 X10(3)/MCL (ref 0–0.9)
EOSINOPHIL NFR BLD AUTO: 0.6 %
ERYTHROCYTE [DISTWIDTH] IN BLOOD BY AUTOMATED COUNT: 14.2 % (ref 11.5–17)
FLUAV AG UPPER RESP QL IA.RAPID: NOT DETECTED
FLUBV AG UPPER RESP QL IA.RAPID: NOT DETECTED
GFR SERPLBLD CREATININE-BSD FMLA CKD-EPI: >60 ML/MIN/1.73/M2
GLOBULIN SER-MCNC: 5.3 GM/DL (ref 2.4–3.5)
GLUCOSE SERPL-MCNC: 88 MG/DL (ref 74–100)
GLUCOSE UR QL STRIP: NORMAL
HCT VFR BLD AUTO: 38.1 % (ref 37–47)
HGB BLD-MCNC: 12.5 G/DL (ref 12–16)
HGB UR QL STRIP: NEGATIVE
IMM GRANULOCYTES # BLD AUTO: 0.04 X10(3)/MCL (ref 0–0.04)
IMM GRANULOCYTES NFR BLD AUTO: 0.3 %
KETONES UR QL STRIP: NEGATIVE
LEUKOCYTE ESTERASE UR QL STRIP: NEGATIVE
LYMPHOCYTES # BLD AUTO: 3.71 X10(3)/MCL (ref 0.6–4.6)
LYMPHOCYTES NFR BLD AUTO: 26.2 %
MCH RBC QN AUTO: 27.4 PG (ref 27–31)
MCHC RBC AUTO-ENTMCNC: 32.8 G/DL (ref 33–36)
MCV RBC AUTO: 83.4 FL (ref 80–94)
MONOCYTES # BLD AUTO: 0.84 X10(3)/MCL (ref 0.1–1.3)
MONOCYTES NFR BLD AUTO: 5.9 %
MUCOUS THREADS URNS QL MICRO: ABNORMAL /LPF
NEUTROPHILS # BLD AUTO: 9.43 X10(3)/MCL (ref 2.1–9.2)
NEUTROPHILS NFR BLD AUTO: 66.6 %
NITRITE UR QL STRIP: NEGATIVE
NRBC BLD AUTO-RTO: 0 %
PH UR STRIP: 7.5 [PH]
PLATELET # BLD AUTO: 448 X10(3)/MCL (ref 130–400)
PMV BLD AUTO: 9 FL (ref 7.4–10.4)
POTASSIUM SERPL-SCNC: 3.6 MMOL/L (ref 3.5–5.1)
PROT SERPL-MCNC: 8.7 GM/DL (ref 6.4–8.3)
PROT UR QL STRIP: NEGATIVE
RBC # BLD AUTO: 4.57 X10(6)/MCL (ref 4.2–5.4)
RBC #/AREA URNS AUTO: ABNORMAL /HPF
SARS-COV-2 RNA RESP QL NAA+PROBE: NOT DETECTED
SODIUM SERPL-SCNC: 134 MMOL/L (ref 136–145)
SP GR UR STRIP.AUTO: 1.02 (ref 1–1.03)
SQUAMOUS #/AREA URNS LPF: ABNORMAL /HPF
STREP A PCR (OHS): NOT DETECTED
UROBILINOGEN UR STRIP-ACNC: NORMAL
WBC # BLD AUTO: 14.16 X10(3)/MCL (ref 4.5–11.5)
WBC #/AREA URNS AUTO: ABNORMAL /HPF

## 2025-01-18 PROCEDURE — 81025 URINE PREGNANCY TEST: CPT | Performed by: NURSE PRACTITIONER

## 2025-01-18 PROCEDURE — 81001 URINALYSIS AUTO W/SCOPE: CPT | Performed by: NURSE PRACTITIONER

## 2025-01-18 PROCEDURE — 87651 STREP A DNA AMP PROBE: CPT | Performed by: NURSE PRACTITIONER

## 2025-01-18 PROCEDURE — 99284 EMERGENCY DEPT VISIT MOD MDM: CPT | Mod: 25

## 2025-01-18 PROCEDURE — 80053 COMPREHEN METABOLIC PANEL: CPT | Performed by: NURSE PRACTITIONER

## 2025-01-18 PROCEDURE — 0240U COVID/FLU A&B PCR: CPT | Performed by: NURSE PRACTITIONER

## 2025-01-18 PROCEDURE — 85025 COMPLETE CBC W/AUTO DIFF WBC: CPT | Performed by: NURSE PRACTITIONER

## 2025-01-18 RX ORDER — PROMETHAZINE HYDROCHLORIDE AND DEXTROMETHORPHAN HYDROBROMIDE 6.25; 15 MG/5ML; MG/5ML
5 SYRUP ORAL EVERY 6 HOURS PRN
Qty: 120 ML | Refills: 0 | Status: SHIPPED | OUTPATIENT
Start: 2025-01-18

## 2025-01-18 RX ORDER — DOXYCYCLINE 100 MG/1
100 CAPSULE ORAL 2 TIMES DAILY
Qty: 14 CAPSULE | Refills: 0 | Status: SHIPPED | OUTPATIENT
Start: 2025-01-18 | End: 2025-01-25

## 2025-01-18 RX ORDER — CETIRIZINE HYDROCHLORIDE 10 MG/1
10 TABLET ORAL DAILY
Qty: 30 TABLET | Refills: 0 | Status: SHIPPED | OUTPATIENT
Start: 2025-01-18 | End: 2025-02-17

## 2025-01-18 NOTE — ED NOTES
Pt alert and oriented at this time. Pt denies changes from initial assessment. Pt does not appear to be in immediate distress at this time.

## 2025-01-18 NOTE — FIRST PROVIDER EVALUATION
Medical screening examination initiated.  I have conducted a focused provider triage encounter, findings are as follows:    Brief history of present illness:  43y/o F presents to the ED with subjective fever and cough. Onset three days    There were no vitals filed for this visit.    Pertinent physical exam:  AAA x 3    Brief workup plan:  Labs    Preliminary workup initiated; this workup will be continued and followed by the physician or advanced practice provider that is assigned to the patient when roomed.

## 2025-01-18 NOTE — ED PROVIDER NOTES
Encounter Date: 2025       History     Chief Complaint   Patient presents with    Cough     Cough, congestion, fever, green sputum x 3 days, family has flu     See MDM    The history is provided by the patient. The history is limited by a language barrier. A  was used.     Review of patient's allergies indicates:  No Known Allergies  Past Medical History:   Diagnosis Date    Human immunodeficiency virus (HIV) disease     Human immunodeficiency virus (HIV) disease     TB (tuberculosis)      Past Surgical History:   Procedure Laterality Date     SECTION      LAPAROSCOPIC APPENDECTOMY N/A 2022    Procedure: APPENDECTOMY, LAPAROSCOPIC;  Surgeon: Bethel Herrmann Jr., MD;  Location: [facility];  Service: General;  Laterality: N/A;     No family history on file.  Social History     Tobacco Use    Smoking status: Never     Passive exposure: Never    Smokeless tobacco: Never   Substance Use Topics    Alcohol use: Not Currently    Drug use: Yes     Review of Systems   Constitutional:  Positive for fever.   HENT:  Positive for congestion and ear pain.    Respiratory:  Positive for cough.    All other systems reviewed and are negative.      Physical Exam     Initial Vitals [25 1126]   BP Pulse Resp Temp SpO2   (!) 92/50 106 16 98.7 °F (37.1 °C) 97 %      MAP       --         Physical Exam    Nursing note and vitals reviewed.  Constitutional: She appears well-developed and well-nourished.   Eyes: Conjunctivae are normal.   Cardiovascular:  Normal rate, regular rhythm and normal heart sounds.           Pulmonary/Chest: Breath sounds normal. No respiratory distress.     +cough     Neurological: She is alert and oriented to person, place, and time.   Skin: Skin is warm and dry.   Psychiatric: She has a normal mood and affect.         ED Course   Procedures  Labs Reviewed   COMPREHENSIVE METABOLIC PANEL - Abnormal       Result Value    Sodium 134 (*)     Potassium 3.6      Chloride 104       CO2 23      Glucose 88      Blood Urea Nitrogen 5.8 (*)     Creatinine 0.74      Calcium 8.8      Protein Total 8.7 (*)     Albumin 3.4 (*)     Globulin 5.3 (*)     Albumin/Globulin Ratio 0.6 (*)     Bilirubin Total 0.5      ALP 87      ALT 15      AST 23      eGFR >60      Anion Gap 7.0      BUN/Creatinine Ratio 8     URINALYSIS - Abnormal    Color, UA Light-Yellow      Appearance, UA Clear      Specific Gravity, UA 1.017      pH, UA 7.5      Protein, UA Negative      Glucose, UA Normal      Ketones, UA Negative      Blood, UA Negative      Bilirubin, UA Negative      Urobilinogen, UA Normal      Nitrites, UA Negative      Leukocyte Esterase, UA Negative      RBC, UA 0-5      WBC, UA 0-5      Bacteria, UA Trace      Squamous Epithelial Cells, UA Occasional (*)     Mucous, UA Trace (*)    CBC WITH DIFFERENTIAL - Abnormal    WBC 14.16 (*)     RBC 4.57      Hgb 12.5      Hct 38.1      MCV 83.4      MCH 27.4      MCHC 32.8 (*)     RDW 14.2      Platelet 448 (*)     MPV 9.0      Neut % 66.6      Lymph % 26.2      Mono % 5.9      Eos % 0.6      Basophil % 0.4      Imm Grans % 0.3      Neut # 9.43 (*)     Lymph # 3.71      Mono # 0.84      Eos # 0.08      Baso # 0.06      Imm Gran # 0.04      NRBC% 0.0     COVID/FLU A&B PCR - Normal    Influenza A PCR Not Detected      Influenza B PCR Not Detected      SARS-CoV-2 PCR Not Detected      Narrative:     The Xpert Xpress SARS-CoV-2/FLU/RSV plus is a rapid, multiplexed real-time PCR test intended for the simultaneous qualitative detection and differentiation of SARS-CoV-2, Influenza A, Influenza B, and respiratory syncytial virus (RSV) viral RNA in either nasopharyngeal swab or nasal swab specimens.         STREP GROUP A BY PCR - Normal    STREP A PCR ([facility]) Not Detected      Narrative:     The Xpert Xpress Strep A test is a rapid, qualitative in vitro diagnostic test for the detection of Streptococcus pyogenes (Group A ß-hemolytic Streptococcus, Strep A) in throat swab  specimens from patients with signs and symptoms of pharyngitis.     PREGNANCY TEST, URINE RAPID - Normal    hCG Qualitative, Urine Negative     CBC W/ AUTO DIFFERENTIAL    Narrative:     The following orders were created for panel order CBC Auto Differential.  Procedure                               Abnormality         Status                     ---------                               -----------         ------                     CBC with Differential[[id number]]        Abnormal            Final result                 Please view results for these tests on the individual orders.          Imaging Results              X-Ray Chest PA And Lateral (Final result)  Result time 01/18/25 13:34:12      Final result by Afshin Wang MD (01/18/25 13:34:12)                   Impression:      Similar appearance of the chest.      Electronically signed by: Afshin Wang  Date:    01/18/2025  Time:    13:34               Narrative:    EXAMINATION:  XR CHEST PA AND LATERAL    CLINICAL HISTORY:  Fever, unspecified    COMPARISON:  15 March 2015    FINDINGS:  PA and lateral views of the chest were obtained. Cardiac silhouette obscured.  Similar cystic replacement of the left hemithorax.  Right lung grossly clear.  No pneumothorax seen.                                       Medications - No data to display  Medical Decision Making  45 y/o female with pmhx HIV presents with cough, congestion and fever for the past 3 days. States her  and child had flu around gabby. She denies smoking.     Labs show mild elevation of wbc. Xray no acute findings. Left cystic hemithorax (not new). Covid/flu negative.    She has had cough, subjective fever. She is HIV+. Will cover with antibiotics for bronchitis vs undiagnosed pneumonia.     Amount and/or Complexity of Data Reviewed  Labs:  Decision-making details documented in ED Course.  Radiology:  Decision-making details documented in ED Course.    Risk  OTC drugs.  Prescription drug  management.      Additional MDM:   Differential Diagnosis:   Other: The following diagnoses were also considered and will be evaluated: pneumonia, flu and covid.                                   Clinical Impression:  Final diagnoses:  [R50.9] Fever  [R05.1] Acute cough (Primary)          ED Disposition Condition    Discharge Stable          ED Prescriptions       Medication Sig Dispense Start Date End Date Auth. Provider    doxycycline (VIBRAMYCIN) 100 MG Cap Take 1 capsule (100 mg total) by mouth 2 (two) times daily. for 7 days 14 capsule 1/18/2025 1/25/2025 Lana Alejandro FNP    cetirizine (ZYRTEC) 10 MG tablet Take 1 tablet (10 mg total) by mouth once daily. 30 tablet 1/18/2025 2/17/2025 Lana Alejandro FNP    promethazine-dextromethorphan (PROMETHAZINE-DM) 6.25-15 mg/5 mL Syrp Take 5 mLs by mouth every 6 (six) hours as needed (cough). 120 mL 1/18/2025 -- Lana Alejandro FNP          Follow-up Information       Follow up With Specialties Details Why Contact Info    primary care provider  Call in 1 week As needed [phone]             Lana Alejandro FNP  01/19/25 0437

## 2025-01-18 NOTE — DISCHARGE INSTRUCTIONS
Take doxycycline twice a day for 7 days to treat lung infection. Promethazine dm for cough as needed. Cetrizine for cough/congestion. Lots of hydration. If symptoms change/worsen, return to ER

## 2025-03-20 DIAGNOSIS — Z12.31 ENCOUNTER FOR SCREENING MAMMOGRAM FOR MALIGNANT NEOPLASM OF BREAST: Primary | ICD-10-CM

## 2025-07-30 ENCOUNTER — HOSPITAL ENCOUNTER (EMERGENCY)
Facility: HOSPITAL | Age: 45
Discharge: HOME | End: 2025-07-30
Attending: EMERGENCY MEDICINE
Payer: COMMERCIAL

## 2025-07-30 VITALS
SYSTOLIC BLOOD PRESSURE: 110 MMHG | OXYGEN SATURATION: 100 % | HEIGHT: 61 IN | WEIGHT: 115 LBS | DIASTOLIC BLOOD PRESSURE: 70 MMHG | TEMPERATURE: 98 F | HEART RATE: 92 BPM | BODY MASS INDEX: 21.71 KG/M2 | RESPIRATION RATE: 18 BRPM
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
## 2025-07-30 DIAGNOSIS — M54.50 ACUTE LEFT-SIDED LOW BACK PAIN WITHOUT SCIATICA: ICD-10-CM

## 2025-07-30 DIAGNOSIS — M79.672 LEFT FOOT PAIN: ICD-10-CM

## 2025-07-30 LAB — B-HCG UR QL: NEGATIVE

## 2025-07-30 PROCEDURE — 99284 EMERGENCY DEPT VISIT MOD MDM: CPT | Mod: 25

## 2025-07-30 PROCEDURE — 81025 URINE PREGNANCY TEST: CPT | Performed by: PHYSICIAN ASSISTANT

## 2025-07-30 RX ORDER — DICLOFENAC SODIUM 50 MG/1
50 TABLET, DELAYED RELEASE ORAL 2 TIMES DAILY PRN
Qty: 20 TABLET | Refills: 0 | Status: SHIPPED | OUTPATIENT
Start: 2025-07-30

## 2025-07-30 RX ORDER — CYCLOBENZAPRINE HCL 10 MG
10 TABLET ORAL 3 TIMES DAILY PRN
Qty: 15 TABLET | Refills: 0 | Status: SHIPPED | OUTPATIENT
Start: 2025-07-30 | End: 2025-08-04
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
## 2025-07-31 NOTE — DISCHARGE INSTRUCTIONS
¡Cintia por dejarnos atenderte hoy! Nuestro objetivo es ofrecerte margot atención gretchen y mantenerte cómodo e informado. Si tienes alguna pregunta antes de irte, estaré encantado de intentar responderla.Aquí tienes algunos consejos después de tu visita:Tu visita en el departamento de urgencias NO es atención definitiva; por favor, sigue con tu médico de atención primaria y/o especialista dentro de margot semana. Regresa si experimentas un empeoramiento en tu condición o si tienes alguna otra preocupación.     Si tuvo exámenes de radiología blair margot radiografía o margot tomografía en el departamento de emergencias, la interpretación de estos puede ser preliminar; puede rashard hallazgos menos urgentes en los informes. Por favor, obtenga estos informes dentro de las 24 horas en el hospital o utilizando kelly móvil para acceder a los registros. Lleve estos a kelly médico de atención primaria y/o a un especialista para margot revisión adicional de los hallazgos incidentales.     Le venegas recetado diclofenaco para el dolor. Rachel es un medicamento antiinflamatorio no esteroideo (KIM). No tome ningún KIM adicional mientras esté tomando rachel medicamento, incluidos (Advil, Aleve, Motrin, Ibuprofeno, Mobicmeloxicam, Naprosyn, Toradol, etc.). Deje de maria esther rachel medicamento si experimenta: debilidad, picazón, piel u ojos amarillos, syd en las articulaciones, vómitos con nicholas, nicholas o heces negras, aumento de peso inusual o hinchazón en los brazos, piernas, jaye o pies.    Le venegas recetado Flexeril (ciclobenzaprina) para espasmos/dolor muscular. No tome rachel medicamento mientras trabaja, ivis alcohol, nada o mientras conduce/opera maquinaria pesada. Rachel medicamento puede causar somnolencia, mareos, afectar el juicio y reducir las capacidades físicas. No debe conducir, operar maquinaria pesada ni maria esther decisiones que cambien la yoanna mientras glory rachel medicamento.

## (undated) DEVICE — RELOAD ECHELON ENDOPATH 45MM

## (undated) DEVICE — SOL IRRI STRL WATER 1000ML

## (undated) DEVICE — CORD LAP 10 DISP

## (undated) DEVICE — WARMER DRAPE STERILE LF

## (undated) DEVICE — CLOSURE SKIN STERI STRIP 1/2X4

## (undated) DEVICE — IRRIGATOR HYDRO-SURG PLUS 5MM

## (undated) DEVICE — TROCAR ENDOPATH XCEL 5X100MM

## (undated) DEVICE — KIT SURGICAL TURNOVER

## (undated) DEVICE — ELECTRODE PATIENT RETURN DISP

## (undated) DEVICE — CANNULA ENDOPATH XCEL 5X100MM

## (undated) DEVICE — SUT VICRYL PLUS 4-0 FS-2 27IN

## (undated) DEVICE — BAG SPEC RETRV ENDO 4X6IN DISP

## (undated) DEVICE — DRESSING TELFA N ADH 3X8IN

## (undated) DEVICE — SCISSOR CURVED ENDOPATH 5MM

## (undated) DEVICE — APPLICATOR STRL COT 2INNR 6IN

## (undated) DEVICE — GLOVE PROTEXIS HYDROGEL SZ7.5

## (undated) DEVICE — STAPLER ECHELON FLEX GST 45MM

## (undated) DEVICE — CHLORAPREP W TINT 26ML APPL

## (undated) DEVICE — BENZOIN TINCTURE 0.66ML

## (undated) DEVICE — SYR 10CC LUER LOCK

## (undated) DEVICE — SUT VICRYL+ 27 UR-6 VIOL

## (undated) DEVICE — KIT GEN LAPAROSCOPY LAFAYETTE

## (undated) DEVICE — TROCAR ENDOPATH XCEL 12MM 10CM

## (undated) DEVICE — TRAY CATH FOL SIL URIMTR 16FR

## (undated) DEVICE — NDL HYPO REG 25G X 1 1/2